# Patient Record
Sex: MALE | Race: OTHER | NOT HISPANIC OR LATINO | URBAN - METROPOLITAN AREA
[De-identification: names, ages, dates, MRNs, and addresses within clinical notes are randomized per-mention and may not be internally consistent; named-entity substitution may affect disease eponyms.]

---
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
## 2023-01-09 NOTE — BH INPATIENT PSYCHIATRY ASSESSMENT NOTE - NSBHMETABOLIC_PSY_ALL_CORE_FT
Done    BMI: BMI (kg/m2): 26.1 (03-26-22 @ 22:07)  HbA1c: A1C with Estimated Average Glucose Result: 4.3 % (03-27-22 @ 10:54)    Glucose:   BP: 125/60 (03-27-22 @ 07:46) (125/60 - 125/60)  Lipid Panel: Date/Time: 03-27-22 @ 10:54  Cholesterol, Serum: 147  Direct LDL: --  HDL Cholesterol, Serum: 55  Total Cholesterol/HDL Ration Measurement: --  Triglycerides, Serum: 70

## 2023-03-15 NOTE — BH PATIENT PROFILE - FUNCTIONAL ASSESSMENT - DAILY ACTIVITY 5.
Physical Therapy Visit    Visit Type: Daily Treatment Note  Visit: 12  Referring Provider: LUCITA Miller  Medical Diagnosis (from order): Diagnosis Information    Diagnosis  729.89 (ICD-9-CM) - R29.898 (ICD-10-CM) - Decreased strength of lower extremity         SUBJECTIVE                                                                                                               Reports that he is having a good day today. Notes that the hip has been feeling a little better as of late. Notes he talked to the MD and he does not have any weight restrictions at this time and is able to perform the elliptical.       OBJECTIVE                                                                                                                                       Treatment     Therapeutic Exercise  Upright bike, level 3, x8 minutes  Bridges x10   Hook-lying lower abdominal activation x10  Hook-lying lower abdominal activation x15  Leg press 105 lbs x15  Leg press 115 x15  Chops x10 with red resistance band       Manual Therapy   Hip harmonics, with towel roll under knee, grade I, 3x10 bilaterally   Hip long axis traction, supine, grade II, 3x10 bilaterally   Hip posterior glide, grade III, x10 bilaterally     Skilled input: as detailed above    Writer verbally educated and received verbal consent for hand placement, positioning of patient, and techniques to be performed today from patient for hand placement and palpation for techniques, therapist position for techniques and clothing adjustments for techniques as described above and how they are pertinent to the patient's plan of care.    Home Exercise Program  Deferred     Discussed focusing on sit<>Stand from his bed with a focus of hip hinging.       ASSESSMENT                                                                                                            Mike was able to progress the leg press this visit with no increase in discomfort. He has improved  ability to get on/off the plinth and overall has improved quality of motion. He still does lack lower extremity strength and core stability and will continue to benefit from skilled therapy services to maximize function.   Education:   - Results of above outlined education: Verbalizes understanding, Demonstrates understanding and Needs reinforcement    PLAN                                                                                                                           Suggestions for next session as indicated: Progress per plan of care, trial lifting activities, continue to progress core strength and stability.        Therapy procedure time and total treatment time can be found documented on the Time Entry flowsheet     4 = No assist / stand by assistance

## 2023-05-31 NOTE — BH INPATIENT PSYCHIATRY PROGRESS NOTE - NSBHCONSULTIPREASON_PSY_A_CORE
No danger to others; mental illness expected to respond to inpatient care danger to self; mental illness expected to respond to inpatient care

## 2023-09-12 ENCOUNTER — INPATIENT (INPATIENT)
Facility: HOSPITAL | Age: 18
LOS: 9 days | Discharge: HOME | End: 2023-09-22
Attending: PSYCHIATRY & NEUROLOGY | Admitting: PSYCHIATRY & NEUROLOGY
Payer: COMMERCIAL

## 2023-09-12 ENCOUNTER — OUTPATIENT (OUTPATIENT)
Dept: OUTPATIENT SERVICES | Facility: HOSPITAL | Age: 18
LOS: 1 days | Discharge: STILL ADMITTED OR ADMINISTRATIVE | End: 2023-09-12

## 2023-09-12 VITALS — TEMPERATURE: 98 F | WEIGHT: 194.01 LBS | HEIGHT: 71 IN

## 2023-09-12 DIAGNOSIS — F29 UNSPECIFIED PSYCHOSIS NOT DUE TO A SUBSTANCE OR KNOWN PHYSIOLOGICAL CONDITION: ICD-10-CM

## 2023-09-12 DIAGNOSIS — F19.10 OTHER PSYCHOACTIVE SUBSTANCE ABUSE, UNCOMPLICATED: ICD-10-CM

## 2023-09-12 LAB
FLUAV AG NPH QL: SIGNIFICANT CHANGE UP
FLUBV AG NPH QL: SIGNIFICANT CHANGE UP
RSV RNA NPH QL NAA+NON-PROBE: SIGNIFICANT CHANGE UP
SARS-COV-2 RNA SPEC QL NAA+PROBE: SIGNIFICANT CHANGE UP

## 2023-09-12 PROCEDURE — 99222 1ST HOSP IP/OBS MODERATE 55: CPT

## 2023-09-12 RX ORDER — DIPHENHYDRAMINE HCL 50 MG
50 CAPSULE ORAL ONCE
Refills: 0 | Status: DISCONTINUED | OUTPATIENT
Start: 2023-09-12 | End: 2023-09-22

## 2023-09-12 RX ORDER — HALOPERIDOL DECANOATE 100 MG/ML
5 INJECTION INTRAMUSCULAR EVERY 6 HOURS
Refills: 0 | Status: DISCONTINUED | OUTPATIENT
Start: 2023-09-12 | End: 2023-09-13

## 2023-09-12 RX ORDER — ACETAMINOPHEN 500 MG
650 TABLET ORAL EVERY 6 HOURS
Refills: 0 | Status: DISCONTINUED | OUTPATIENT
Start: 2023-09-12 | End: 2023-09-22

## 2023-09-12 RX ORDER — HALOPERIDOL DECANOATE 100 MG/ML
5 INJECTION INTRAMUSCULAR ONCE
Refills: 0 | Status: DISCONTINUED | OUTPATIENT
Start: 2023-09-12 | End: 2023-09-22

## 2023-09-12 RX ORDER — RISPERIDONE 4 MG/1
2 TABLET ORAL AT BEDTIME
Refills: 0 | Status: DISCONTINUED | OUTPATIENT
Start: 2023-09-12 | End: 2023-09-13

## 2023-09-12 RX ORDER — MAGNESIUM HYDROXIDE 400 MG/1
30 TABLET, CHEWABLE ORAL DAILY
Refills: 0 | Status: DISCONTINUED | OUTPATIENT
Start: 2023-09-12 | End: 2023-09-22

## 2023-09-12 RX ORDER — INFLUENZA VIRUS VACCINE 15; 15; 15; 15 UG/.5ML; UG/.5ML; UG/.5ML; UG/.5ML
0.5 SUSPENSION INTRAMUSCULAR ONCE
Refills: 0 | Status: DISCONTINUED | OUTPATIENT
Start: 2023-09-12 | End: 2023-09-22

## 2023-09-12 RX ORDER — DIPHENHYDRAMINE HCL 50 MG
50 CAPSULE ORAL EVERY 6 HOURS
Refills: 0 | Status: DISCONTINUED | OUTPATIENT
Start: 2023-09-12 | End: 2023-09-22

## 2023-09-12 RX ORDER — RISPERIDONE 4 MG/1
1 TABLET ORAL DAILY
Refills: 0 | Status: DISCONTINUED | OUTPATIENT
Start: 2023-09-12 | End: 2023-09-13

## 2023-09-12 RX ORDER — RISPERIDONE 4 MG/1
1 TABLET ORAL
Refills: 0 | Status: DISCONTINUED | OUTPATIENT
Start: 2023-09-12 | End: 2023-09-12

## 2023-09-12 RX ORDER — TRAZODONE HCL 50 MG
50 TABLET ORAL AT BEDTIME
Refills: 0 | Status: DISCONTINUED | OUTPATIENT
Start: 2023-09-12 | End: 2023-09-18

## 2023-09-12 RX ADMIN — Medication 50 MILLIGRAM(S): at 20:22

## 2023-09-12 RX ADMIN — RISPERIDONE 2 MILLIGRAM(S): 4 TABLET ORAL at 20:22

## 2023-09-12 NOTE — BH INPATIENT PSYCHIATRY ASSESSMENT NOTE - NSBHASSESSSUMMFT_PSY_ALL_CORE
- restart risperdal and increase to 1mg qd and 2mg po qhs  - routine admission labs and EKG ordered since [facility]   - symptom triggered CIWA ordered given reports of daily alcohol use and vague report on actual amount used

## 2023-09-12 NOTE — BH INPATIENT PSYCHIATRY ASSESSMENT NOTE - CURRENT MEDICATION
MEDICATIONS  (STANDING):    MEDICATIONS  (PRN):  acetaminophen     Tablet .. 650 milliGRAM(s) Oral every 6 hours PRN Temp greater or equal to 38C (100.4F), Mild Pain (1 - 3)  aluminum hydroxide/magnesium hydroxide/simethicone Suspension 30 milliLiter(s) Oral every 6 hours PRN Dyspepsia  diphenhydrAMINE 50 milliGRAM(s) Oral every 6 hours PRN Extrapyramidal prophylaxis or symptoms  haloperidol     Tablet 5 milliGRAM(s) Oral every 6 hours PRN mild agitation  haloperidol    Injectable 5 milliGRAM(s) IntraMuscular once PRN severe agitation  magnesium hydroxide Suspension 30 milliLiter(s) Oral daily PRN Constipation  traZODone 50 milliGRAM(s) Oral at bedtime PRN insomnia   MEDICATIONS  (STANDING):  influenza   Vaccine 0.5 milliLiter(s) IntraMuscular once  risperiDONE   Tablet 1 milliGRAM(s) Oral daily  risperiDONE   Tablet 2 milliGRAM(s) Oral at bedtime    MEDICATIONS  (PRN):  acetaminophen     Tablet .. 650 milliGRAM(s) Oral every 6 hours PRN Temp greater or equal to 38C (100.4F), Mild Pain (1 - 3)  aluminum hydroxide/magnesium hydroxide/simethicone Suspension 30 milliLiter(s) Oral every 6 hours PRN Dyspepsia  diphenhydrAMINE 50 milliGRAM(s) Oral every 6 hours PRN Extrapyramidal prophylaxis or symptoms  diphenhydrAMINE Injectable 50 milliGRAM(s) IntraMuscular once PRN Extrapyramidal prophylaxis  haloperidol     Tablet 5 milliGRAM(s) Oral every 6 hours PRN mild agitation  haloperidol    Injectable 5 milliGRAM(s) IntraMuscular once PRN severe agitation  LORazepam     Tablet 1 milliGRAM(s) Oral every 6 hours PRN anxiety  LORazepam   Injectable 2 milliGRAM(s) IntraMuscular once PRN severe anxiousness  magnesium hydroxide Suspension 30 milliLiter(s) Oral daily PRN Constipation  traZODone 50 milliGRAM(s) Oral at bedtime PRN insomnia
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
## 2023-09-12 NOTE — BH INPATIENT PSYCHIATRY ASSESSMENT NOTE - NSTXPROBSUBMIS_PSY_ALL_CORE
----- Message from Sheryl Garces NP sent at 2/17/2020 12:46 PM CST -----  Cholesterol test normal.   SUBSTANCE MISUSE

## 2023-09-12 NOTE — BH PATIENT PROFILE - FUNCTIONAL ASSESSMENT - BASIC MOBILITY 4.
I would recommend she discuss all these changes with her PCP, please forward to Dr. Holcomb  
Patient has questions about her diabetic medication.  Please advise.  
Patient requests a call back to go over instructions for taking the medications below   
Please tell patient that for now we will keep her on actos and not switch to jardiance. For the glipizide she can just start taking 1 tab of the 10mg ER dose daily, no need to cut in half (in place of 2.5mg dose). She should also try doing metformin 500mg XL once in the AM and once in the PM and see how this is tolerated; if it's tolerated well she can try slowly adding one pill to each dose over time as often if it's increased very slowly any side effects are minimized. Thanks!  
Received a fax from pharmacy. Glipizide ER 10 mg tablets cant be split in half. A new prescription needs to be placed in order to follow the direction from PCP. Patient also states that She does not want to take Jardiance. She states that she took that previously and it made her sick. Patient states she is not able to take Metformin 500 mg 4 tablets daily. Patient states that  To many tablets causes a GI upset.  
Relayed message below.  Pt verbalized understanding.     
Spoke to patient about below. Patient has no further questions at this time,  
4 = No assist / stand by assistance

## 2023-09-12 NOTE — BH INPATIENT PSYCHIATRY ASSESSMENT NOTE - OTHER
low end of fair  internally preoccupied  limited to low end of fair  looks internally preoccupied and at times concerned  adequate

## 2023-09-12 NOTE — BH INPATIENT PSYCHIATRY ASSESSMENT NOTE - DETAILS
3-5 years ago cutting self and wrapping cord from his headphones around his neck as NSSIB; Pt reports texting 911 "suicide" approx. 3 years ago due to concern he may attempt suicide though denies specific plan at that time, states he was concerned someone was going to harm his family if he didn't harm himself

## 2023-09-12 NOTE — BH INPATIENT PSYCHIATRY ASSESSMENT NOTE - VIOLENCE RISK FACTORS:
Substance abuse/Affective dysregulation/Lack of insight into violence risk/need for treatment/Noncompliance with treatment

## 2023-09-12 NOTE — BH PATIENT PROFILE - FALL HARM RISK - UNIVERSAL INTERVENTIONS
Bed in lowest position, wheels locked, appropriate side rails in place/Call bell, personal items and telephone in reach/Instruct patient to call for assistance before getting out of bed or chair/Non-slip footwear when patient is out of bed/[place] to call system/Physically safe environment - no spills, clutter or unnecessary equipment/Purposeful Proactive Rounding/Room/bathroom lighting operational, light cord in reach

## 2023-09-12 NOTE — BH INPATIENT PSYCHIATRY ASSESSMENT NOTE - NSBHMETABOLIC_PSY_ALL_CORE_FT
BMI:   HbA1c:   Glucose:   BP: --  Lipid Panel:  BMI: BMI (kg/m2): 27.1 (09-12-23 @ 16:35)  HbA1c:   Glucose:   BP: --  Lipid Panel:

## 2023-09-12 NOTE — BH INPATIENT PSYCHIATRY ASSESSMENT NOTE - RISK ASSESSMENT
acute risk factors of psychosis, unable to reality test, noncompliance, hx of substance use, insomnia , decline from baseline functioning

## 2023-09-12 NOTE — BH INPATIENT PSYCHIATRY ASSESSMENT NOTE - NSBHMSEMOVE_PSY_A_CORE
Patients VSS, up to void, wife here to drive him home, MD Forrest here to see patient, all sds discharge instructions, all belongings returned, all sds goals met,  
Pre-Op call completed. Medications list reviewed and updated as needed.  Instructed patient to hold all medications am of surgery except amlodipine and sotalol.  Patient instructed to arrive for surgery at 1430 on 11-27-17 at [facility].       Pre-op instructions reviewed, verbalized understanding.  Questions answered.  Call back number of [phone] given in case other concerns or questions arise.    Staff message sent to Dr. Zac Evans Jr in regards to when to stop Xarelto.  Dr. Field office to call patient and notify of stop date.      Pre-op Teaching Completed:    OR - Procedure, OR - Time and time of arrival, Location of check-in, NPO, Medications to take Day of Surgery, Pain Scale, Skin Prep and Discharge Policy:  Ride/Resp Adult      
No abnormal movements

## 2023-09-12 NOTE — BH INPATIENT PSYCHIATRY ASSESSMENT NOTE - NSBHCHARTREVIEWVS_PSY_A_CORE FT
Vital Signs Last 24 Hrs  T(C): 36.6 (09-12-23 @ 16:35), Max: 36.6 (09-12-23 @ 16:35)  T(F): 97.8 (09-12-23 @ 16:35), Max: 97.8 (09-12-23 @ 16:35)  HR: --  BP: --  BP(mean): --  RR: --  SpO2: --    Orthostatic VS  09-12-23 @ 16:35  Lying BP: --/-- HR: --  Sitting BP: 112/78 HR: 82  Standing BP: 110/70 HR: 80  Site: --  Mode: --   T(C): 36.6 (09-12-23 @ 16:35), Max: 36.6 (09-12-23 @ 16:35)  HR: --  BP: --  RR: --  SpO2: --

## 2023-09-13 DIAGNOSIS — F12.10 CANNABIS ABUSE, UNCOMPLICATED: ICD-10-CM

## 2023-09-13 DIAGNOSIS — F12.159 CANNABIS ABUSE WITH PSYCHOTIC DISORDER, UNSPECIFIED: ICD-10-CM

## 2023-09-13 LAB
A1C WITH ESTIMATED AVERAGE GLUCOSE RESULT: 5.4 % — SIGNIFICANT CHANGE UP (ref 4–5.6)
ANION GAP SERPL CALC-SCNC: 15 MMOL/L — HIGH (ref 7–14)
BUN SERPL-MCNC: 16 MG/DL — SIGNIFICANT CHANGE UP (ref 7–23)
CALCIUM SERPL-MCNC: 9.5 MG/DL — SIGNIFICANT CHANGE UP (ref 8.4–10.5)
CHLORIDE SERPL-SCNC: 102 MMOL/L — SIGNIFICANT CHANGE UP (ref 98–107)
CHOLEST SERPL-MCNC: 150 MG/DL — SIGNIFICANT CHANGE UP
CO2 SERPL-SCNC: 23 MMOL/L — SIGNIFICANT CHANGE UP (ref 22–31)
CREAT SERPL-MCNC: 1.06 MG/DL — SIGNIFICANT CHANGE UP (ref 0.5–1.3)
EGFR: 103 ML/MIN/1.73M2 — SIGNIFICANT CHANGE UP
ESTIMATED AVERAGE GLUCOSE: 108 — SIGNIFICANT CHANGE UP
GLUCOSE SERPL-MCNC: 92 MG/DL — SIGNIFICANT CHANGE UP (ref 70–99)
HCT VFR BLD CALC: 44.8 % — SIGNIFICANT CHANGE UP (ref 39–50)
HDLC SERPL-MCNC: 34 MG/DL — LOW
HGB BLD-MCNC: 15.3 G/DL — SIGNIFICANT CHANGE UP (ref 13–17)
LIPID PNL WITH DIRECT LDL SERPL: 81 MG/DL — SIGNIFICANT CHANGE UP
MAGNESIUM SERPL-MCNC: 2.1 MG/DL — SIGNIFICANT CHANGE UP (ref 1.6–2.6)
MCHC RBC-ENTMCNC: 29.5 PG — SIGNIFICANT CHANGE UP (ref 27–34)
MCHC RBC-ENTMCNC: 34.2 GM/DL — SIGNIFICANT CHANGE UP (ref 32–36)
MCV RBC AUTO: 86.3 FL — SIGNIFICANT CHANGE UP (ref 80–100)
NON HDL CHOLESTEROL: 116 MG/DL — SIGNIFICANT CHANGE UP
NRBC # BLD: 0 /100 WBCS — SIGNIFICANT CHANGE UP (ref 0–0)
NRBC # FLD: 0 K/UL — SIGNIFICANT CHANGE UP (ref 0–0)
PHOSPHATE SERPL-MCNC: 3.2 MG/DL — SIGNIFICANT CHANGE UP (ref 2.5–4.5)
PLATELET # BLD AUTO: 261 K/UL — SIGNIFICANT CHANGE UP (ref 150–400)
POTASSIUM SERPL-MCNC: 4.2 MMOL/L — SIGNIFICANT CHANGE UP (ref 3.5–5.3)
POTASSIUM SERPL-SCNC: 4.2 MMOL/L — SIGNIFICANT CHANGE UP (ref 3.5–5.3)
RBC # BLD: 5.19 M/UL — SIGNIFICANT CHANGE UP (ref 4.2–5.8)
RBC # FLD: 13.2 % — SIGNIFICANT CHANGE UP (ref 10.3–14.5)
SODIUM SERPL-SCNC: 140 MMOL/L — SIGNIFICANT CHANGE UP (ref 135–145)
TRIGL SERPL-MCNC: 173 MG/DL — HIGH
TSH SERPL-MCNC: 2.23 UIU/ML — SIGNIFICANT CHANGE UP (ref 0.27–4.2)
WBC # BLD: 6.87 K/UL — SIGNIFICANT CHANGE UP (ref 3.8–10.5)
WBC # FLD AUTO: 6.87 K/UL — SIGNIFICANT CHANGE UP (ref 3.8–10.5)

## 2023-09-13 PROCEDURE — 93010 ELECTROCARDIOGRAM REPORT: CPT

## 2023-09-13 RX ORDER — ARIPIPRAZOLE 15 MG/1
5 TABLET ORAL AT BEDTIME
Refills: 0 | Status: DISCONTINUED | OUTPATIENT
Start: 2023-09-13 | End: 2023-09-18

## 2023-09-13 RX ORDER — OLANZAPINE 15 MG/1
5 TABLET, FILM COATED ORAL EVERY 4 HOURS
Refills: 0 | Status: DISCONTINUED | OUTPATIENT
Start: 2023-09-13 | End: 2023-09-22

## 2023-09-13 RX ORDER — ARIPIPRAZOLE 15 MG/1
2 TABLET ORAL DAILY
Refills: 0 | Status: DISCONTINUED | OUTPATIENT
Start: 2023-09-14 | End: 2023-09-18

## 2023-09-13 RX ORDER — PHENYLEPHRINE-SHARK LIVER OIL-MINERAL OIL-PETROLATUM RECTAL OINTMENT
1 OINTMENT (GRAM) RECTAL EVERY 6 HOURS
Refills: 0 | Status: DISCONTINUED | OUTPATIENT
Start: 2023-09-13 | End: 2023-09-22

## 2023-09-13 RX ADMIN — ARIPIPRAZOLE 5 MILLIGRAM(S): 15 TABLET ORAL at 20:16

## 2023-09-13 RX ADMIN — RISPERIDONE 1 MILLIGRAM(S): 4 TABLET ORAL at 08:05

## 2023-09-13 RX ADMIN — Medication 1 MILLIGRAM(S): at 03:12

## 2023-09-13 NOTE — PSYCHIATRIC REHAB INITIAL EVALUATION - NSBHPRRECOMMEND_PSY_ALL_CORE
Writer met with patient in order to orient patient to unit, and introduce patient to psychiatric staff and department functions. Patient was also oriented to safety planning protocol and was informed that safety planning will be an integral aspect of their care during their inpatient hospitalization. Patient was sleeping but responded to writer's verbal prompts. Pt was disorganized and was superficially engaged during the meeting. Pt reported that his paranoid thoughts towards his family and believes his families are clones and he does not feel safe to stay with them. Pt also reported VH of seeing things but could not elaborate it and stated that he needs to go to check his eyes. Pt denies SI/HI/AV at this time. Pt started masturbating when writer attempted to explore coping skills with pt. Pt was able to be redirected and was informed that was not appropriate doing it right in front of people. Pt explained masturbating is his way to comfort himself. Writer encouraged the pt to attend psych rehab groups to explore healthy coping skills. Writer collaborated with patient to select an appropriate psychiatric rehabilitation goal. Psychiatric rehabilitation staff will continue to engage patient daily in order to develop therapeutic rapport.

## 2023-09-13 NOTE — PSYCHIATRIC REHAB INITIAL EVALUATION - NSBHALCSUBTREAT_PSY_ALL_CORE
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

## 2023-09-13 NOTE — BH INPATIENT PSYCHIATRY PROGRESS NOTE - NSBHMETABOLIC_PSY_ALL_CORE_FT
BMI: BMI (kg/m2): 27.1 (09-12-23 @ 16:35)  HbA1c: A1C with Estimated Average Glucose Result: 5.4 % (09-13-23 @ 09:30)    Glucose:   BP: 126/79 (09-13-23 @ 06:35) (126/79 - 126/79)  Lipid Panel:  BMI: BMI (kg/m2): 27.1 (09-12-23 @ 16:35)  HbA1c: A1C with Estimated Average Glucose Result: 5.4 % (09-13-23 @ 09:30)    Glucose:   BP: 126/79 (09-13-23 @ 06:35) (126/79 - 126/79)  Lipid Panel: Date/Time: 09-13-23 @ 09:30  Cholesterol, Serum: 150  Direct LDL: --  HDL Cholesterol, Serum: 34  Total Cholesterol/HDL Ration Measurement: --  Triglycerides, Serum: 173

## 2023-09-13 NOTE — BH INPATIENT PSYCHIATRY PROGRESS NOTE - PRN MEDS
MEDICATIONS  (PRN):  acetaminophen     Tablet .. 650 milliGRAM(s) Oral every 6 hours PRN Temp greater or equal to 38C (100.4F), Mild Pain (1 - 3)  aluminum hydroxide/magnesium hydroxide/simethicone Suspension 30 milliLiter(s) Oral every 6 hours PRN Dyspepsia  diphenhydrAMINE 50 milliGRAM(s) Oral every 6 hours PRN Extrapyramidal prophylaxis or symptoms  diphenhydrAMINE Injectable 50 milliGRAM(s) IntraMuscular once PRN Extrapyramidal prophylaxis  haloperidol     Tablet 5 milliGRAM(s) Oral every 6 hours PRN mild agitation  haloperidol    Injectable 5 milliGRAM(s) IntraMuscular once PRN severe agitation  LORazepam     Tablet 2 milliGRAM(s) Oral every 2 hours PRN CIWA score 8 to 11  LORazepam     Tablet 1 milliGRAM(s) Oral every 6 hours PRN anxiety  LORazepam   Injectable 2 milliGRAM(s) IntraMuscular once PRN severe anxiousness  LORazepam   Injectable 2 milliGRAM(s) IntraMuscular every 2 hours PRN CIWA score 12 or higher  magnesium hydroxide Suspension 30 milliLiter(s) Oral daily PRN Constipation  traZODone 50 milliGRAM(s) Oral at bedtime PRN insomnia   MEDICATIONS  (PRN):  acetaminophen     Tablet .. 650 milliGRAM(s) Oral every 6 hours PRN Temp greater or equal to 38C (100.4F), Mild Pain (1 - 3)  aluminum hydroxide/magnesium hydroxide/simethicone Suspension 30 milliLiter(s) Oral every 6 hours PRN Dyspepsia  diphenhydrAMINE 50 milliGRAM(s) Oral every 6 hours PRN Extrapyramidal prophylaxis or symptoms  diphenhydrAMINE Injectable 50 milliGRAM(s) IntraMuscular once PRN Extrapyramidal prophylaxis  haloperidol     Tablet 5 milliGRAM(s) Oral every 6 hours PRN mild agitation  haloperidol    Injectable 5 milliGRAM(s) IntraMuscular once PRN severe agitation  LORazepam     Tablet 1 milliGRAM(s) Oral every 6 hours PRN anxiety  LORazepam     Tablet 2 milliGRAM(s) Oral every 2 hours PRN CIWA score 8 to 11  LORazepam   Injectable 2 milliGRAM(s) IntraMuscular once PRN severe anxiousness  LORazepam   Injectable 2 milliGRAM(s) IntraMuscular every 2 hours PRN CIWA score 12 or higher  magnesium hydroxide Suspension 30 milliLiter(s) Oral daily PRN Constipation  traZODone 50 milliGRAM(s) Oral at bedtime PRN insomnia   MEDICATIONS  (PRN):  acetaminophen     Tablet .. 650 milliGRAM(s) Oral every 6 hours PRN Temp greater or equal to 38C (100.4F), Mild Pain (1 - 3)  aluminum hydroxide/magnesium hydroxide/simethicone Suspension 30 milliLiter(s) Oral every 6 hours PRN Dyspepsia  diphenhydrAMINE 50 milliGRAM(s) Oral every 6 hours PRN Extrapyramidal prophylaxis or symptoms  diphenhydrAMINE Injectable 50 milliGRAM(s) IntraMuscular once PRN Extrapyramidal prophylaxis  haloperidol    Injectable 5 milliGRAM(s) IntraMuscular once PRN severe agitation  LORazepam     Tablet 2 milliGRAM(s) Oral every 2 hours PRN CIWA score 8 to 11  LORazepam     Tablet 1 milliGRAM(s) Oral every 6 hours PRN anxiety  LORazepam   Injectable 2 milliGRAM(s) IntraMuscular once PRN severe anxiousness  LORazepam   Injectable 2 milliGRAM(s) IntraMuscular every 2 hours PRN CIWA score 12 or higher  magnesium hydroxide Suspension 30 milliLiter(s) Oral daily PRN Constipation  OLANZapine 5 milliGRAM(s) Oral every 4 hours PRN agitation  traZODone 50 milliGRAM(s) Oral at bedtime PRN insomnia

## 2023-09-13 NOTE — BH INPATIENT PSYCHIATRY PROGRESS NOTE - NSBHASSESSSUMMFT_PSY_ALL_CORE
- restart risperdal and increase to 1mg qd and 2mg po qhs  - routine admission labs and EKG ordered since [facility] came to [facility]   - symptom triggered CIWA ordered given reports of daily alcohol use and vague report on actual amount used  18 year old male domiciled with family, PPS of cannabis-induced psychosis with multiple admissions since 2022, presenting with worsening psychosis since 8/30- restart risperdal and increase to 1mg qd and 2mg po qhs  - routine admission labs and EKG ordered since Patient came to [facility]   - symptom triggered CIWA ordered given reports of daily alcohol use and vague report on actual amount used  18 year old male domiciled with family, PPS of psychosis secondary to cannabis, hx of multiple admissions since 2022, hx of SA 3 years ago, cannabis use disorder, no hx of violence, no PMHx, presented with worsening cannabis induced-psychosis and paranoia since 08/30.    - restart risperdal and increase to 1mg qd and 2mg po qhs  - routine admission labs and EKG ordered since Patient came to [facility]   - symptom triggered CIWA ordered given reports of daily alcohol use and vague report on actual amount used     Updates 09/13  Patient cooperative on examination   Patient endorses delusions about family being possessed by demons   Patient amenable to medication adjustment    PLAN 18 year old male domiciled with family, PPS of psychosis secondary to cannabis, hx of multiple admissions since 2022, hx of SA 3 years ago, cannabis use disorder, no hx of violence, no PMHx, presented with worsening cannabis induced-psychosis and paranoia since 08/30.    - restart risperdal and increase to 1mg qd and 2mg po qhs  - routine admission labs and EKG ordered since Patient came to [facility]   - symptom triggered CIWA ordered given reports of daily alcohol use and vague report on actual amount used     Updates 09/13  Patient cooperative on examination   Patient endorses delusions about family being possessed by demons   Patient amenable to medication adjustment    PLAN  Patient requires acute inpatient care for treatment of psychosis  Patient admitted on voluntary status   Patient does not require constant observation at this time and will follow with routine checks   Treatment will include individual therapy/supportive therapy/rehab therapy/ psychopharmacological therapy and milieu therapy  18 year old male domiciled with family, PPS of psychosis, hx of multiple admissions since 2022, hx of SA 3 years ago, cannabis use disorder, no hx of violence, no PMHx, presented with worsening cannabis induced-psychosis and paranoia since 08/30.       Updates 09/13  Patient cooperative on examination but delusional and with   Has submitted a 72 hour letter for his discharge  Patient endorses delusions about family being possessed by demons   Patient amenable to medication adjustment    PLAN  Patient requires acute inpatient care for treatment of psychosis  Patient admitted on a 913 voluntary status but submitted 3 day letter on 9/12  Patient does not require constant observation at this time and will follow with routine checks   will discontinue risperidone and start aripiprazole trial with goal of symptom stability and good tolerability with goal of SERRATO  Treatment will include individual therapy/supportive therapy/rehab therapy/ psychopharmacological therapy and milieu therapy

## 2023-09-13 NOTE — BH INPATIENT PSYCHIATRY PROGRESS NOTE - NSBHFUPINTERVALHXFT_PSY_A_CORE
Patient admitted for psychotic episode secondary to cannabis use   Chart reviewed and discussed with team    Patient states that he has been feeling as though his family might be possessed and has felt this way for a few weeks. Patient states he presented to the hospital because he didn't feel safe at home and wanted "a safe place" to sleep at night. Patient states he also heard voices occasionally that someone knows information about him, says voices are usually at night. Patient feels that "someone is getting information" from his brain. Patient states his birth lawrence might have something to do with it, says his cousin told him that everyone has birth marks. Patient states that before this episode he was feeling really good, states he was working out and didn't have any paranoid feelings about his family. Patient states that he used to be on Risperadol but doesn't recall getting any refills. Patient states he has been smoking weed "all year" and it usually makes him feel relaxed. Patient states that sometimes he can "feel the spirits getting closer" Patient also feels that demon can put thoughts in his head, often "bad" scenarios. Patient states in one of those scenarios something bad was going to happen to his grandmother, patient suspected that aunt may also be possessed. Patient states he has never attacked anyone but feels that his family wants to hurt him. Patient says he had a suicide attempt previously where he tried to strangle himself. Patient denies any suicidal ideation. Patient denies visual hallucinations. Patient states that he enjoys exercising, video games, and watches. Patient says he is in school currently and would like to study business. Patient would like to create his own [employer] business. Patient states that he also drinks wine regularly and would drink more if he could afford it. Patient says he is open to taking medications if that would help him. Patient also states his family can be contacted.  Patient admitted for psychotic episode secondary to cannabis use   Chart reviewed and discussed with team    Patient [facility] through [facility]  Has history of 4 previous pt seen for follow up of psychotic symptoms.  Patient remains with thought disorganization but has been in behavioral control and taking medications admissions, 1 at [facility], 1 at [facility] and 2 at [address]   Patient states that he has been feeling as though his family might be possessed and has felt this way for a few weeks. Patient states he presented to the hospital because he didn't feel safe at home and wanted "a safe place" to sleep at night. Patient states he also heard voices occasionally that someone knows information about him, says voices are usually at night. Patient feels that "someone is getting information" from his brain. Patient states his birth lawrence might have something to do with it, says his cousin told him that everyone has birth marks. Patient states that before this episode he was feeling really good, states he was working out and didn't have any paranoid feelings about his family. Patient states that he used to be on Risperadol but doesn't recall getting any refills. Patient states he has been smoking weed "all year" and it usually makes him feel relaxed. Patient states that sometimes he can "feel the spirits getting closer" Patient also feels that demon can put thoughts in his head, often "bad" scenarios. Patient states in one of those scenarios something bad was going to happen to his grandmother, patient suspected that aunt may also be possessed. Patient states he has never attacked anyone but feels that his family wants to hurt him. Patient says he had a suicide attempt previously where he tried to strangle himself. Patient denies any suicidal ideation. Patient denies visual hallucinations. Patient states that he enjoys exercising, video games, and watches. Patient says he is in school currently and would like to study business. 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 Patient states that he also drinks wine regularly and would drink more if he could afford it. Patient says he is open to taking medications if that would help him. Patient also states his family can be contacted.

## 2023-09-13 NOTE — BH INPATIENT PSYCHIATRY PROGRESS NOTE - NSCGISEVERILLNESS_PSY_ALL_CORE
7 = Among the most extremely ill patients – pathology drastically interferes in many life functions; may be hospitalized 6 = Severely ill - disruptive pathology, behavior and function are frequently influenced by symptoms, may require assistance from others

## 2023-09-13 NOTE — BH SOCIAL WORK INITIAL PSYCHOSOCIAL EVALUATION - OTHER PAST PSYCHIATRIC HISTORY (INCLUDE DETAILS REGARDING ONSET, COURSE OF ILLNESS, INPATIENT/OUTPATIENT TREATMENT)
Pt is a 17 yo male with a history of hospitalizations, psychosis and paranoia. Pt was attending [facility], lives at home with parents and brothers. Pt last took psychiatric medication one year ago, has not followed up with psychiatrist. Pt has little insight into his illness, reports he needs his eye tested due to seeing things. Pt reports A/V/H, came to the hospital to rest his mind.

## 2023-09-13 NOTE — BH INPATIENT PSYCHIATRY PROGRESS NOTE - MSE UNSTRUCTURED FT
On exam today the patient is anxious but cooperative.    Speech is of normal rate     Thought process: generally tangential with thought blocking  Thought content: patient has delusions of family members being possessed by demons   Perception:  Hearing voices that someone is "getting information" from him and that "bad things will happen"  Mood: Describes as "afraid".  Affect: flat.    Patient denies active suicidal ideation, intention and plan.    Patient denies active aggressive/homicidal ideation, intent or plan.   Patient is Alert and oriented .   Patient is cognitively grossly intact.   Fund of knowledge is fair. Memory is intact  Insight and judgment are limited. Impulse control is intact at this time.    Vital signs are stable.    On exam today the patient is anxious but cooperative.    Speech is of normal rate     Thought process: generally tangential with thought blocking  Thought content: patient has delusions of family members being possessed by demons or imposters. Also with delusions of thought broadcast/transmission  Perception:  Hearing voices that someone is "getting information" from him and that "bad things will happen"  Mood: Describes as "afraid".  Affect: flat.    Patient denies active suicidal ideation, intention and plan.    Patient denies active aggressive/homicidal ideation, intent or plan.   Patient is Alert and oriented .   Patient is cognitively grossly intact.   Fund of knowledge is fair. Memory is intact  Insight and judgment are limited. Impulse control is intact at this time.    Vital signs are stable.

## 2023-09-13 NOTE — BH INPATIENT PSYCHIATRY PROGRESS NOTE - CURRENT MEDICATION
MEDICATIONS  (STANDING):  influenza   Vaccine 0.5 milliLiter(s) IntraMuscular once  risperiDONE   Tablet 1 milliGRAM(s) Oral daily  risperiDONE   Tablet 2 milliGRAM(s) Oral at bedtime    MEDICATIONS  (PRN):  acetaminophen     Tablet .. 650 milliGRAM(s) Oral every 6 hours PRN Temp greater or equal to 38C (100.4F), Mild Pain (1 - 3)  aluminum hydroxide/magnesium hydroxide/simethicone Suspension 30 milliLiter(s) Oral every 6 hours PRN Dyspepsia  diphenhydrAMINE 50 milliGRAM(s) Oral every 6 hours PRN Extrapyramidal prophylaxis or symptoms  diphenhydrAMINE Injectable 50 milliGRAM(s) IntraMuscular once PRN Extrapyramidal prophylaxis  haloperidol     Tablet 5 milliGRAM(s) Oral every 6 hours PRN mild agitation  haloperidol    Injectable 5 milliGRAM(s) IntraMuscular once PRN severe agitation  LORazepam     Tablet 2 milliGRAM(s) Oral every 2 hours PRN CIWA score 8 to 11  LORazepam     Tablet 1 milliGRAM(s) Oral every 6 hours PRN anxiety  LORazepam   Injectable 2 milliGRAM(s) IntraMuscular once PRN severe anxiousness  LORazepam   Injectable 2 milliGRAM(s) IntraMuscular every 2 hours PRN CIWA score 12 or higher  magnesium hydroxide Suspension 30 milliLiter(s) Oral daily PRN Constipation  traZODone 50 milliGRAM(s) Oral at bedtime PRN insomnia   MEDICATIONS  (STANDING):  influenza   Vaccine 0.5 milliLiter(s) IntraMuscular once  risperiDONE   Tablet 1 milliGRAM(s) Oral daily  risperiDONE   Tablet 2 milliGRAM(s) Oral at bedtime    MEDICATIONS  (PRN):  acetaminophen     Tablet .. 650 milliGRAM(s) Oral every 6 hours PRN Temp greater or equal to 38C (100.4F), Mild Pain (1 - 3)  aluminum hydroxide/magnesium hydroxide/simethicone Suspension 30 milliLiter(s) Oral every 6 hours PRN Dyspepsia  diphenhydrAMINE 50 milliGRAM(s) Oral every 6 hours PRN Extrapyramidal prophylaxis or symptoms  diphenhydrAMINE Injectable 50 milliGRAM(s) IntraMuscular once PRN Extrapyramidal prophylaxis  haloperidol     Tablet 5 milliGRAM(s) Oral every 6 hours PRN mild agitation  haloperidol    Injectable 5 milliGRAM(s) IntraMuscular once PRN severe agitation  LORazepam     Tablet 1 milliGRAM(s) Oral every 6 hours PRN anxiety  LORazepam     Tablet 2 milliGRAM(s) Oral every 2 hours PRN CIWA score 8 to 11  LORazepam   Injectable 2 milliGRAM(s) IntraMuscular once PRN severe anxiousness  LORazepam   Injectable 2 milliGRAM(s) IntraMuscular every 2 hours PRN CIWA score 12 or higher  magnesium hydroxide Suspension 30 milliLiter(s) Oral daily PRN Constipation  traZODone 50 milliGRAM(s) Oral at bedtime PRN insomnia   MEDICATIONS  (STANDING):  ARIPiprazole 5 milliGRAM(s) Oral at bedtime  influenza   Vaccine 0.5 milliLiter(s) IntraMuscular once    MEDICATIONS  (PRN):  acetaminophen     Tablet .. 650 milliGRAM(s) Oral every 6 hours PRN Temp greater or equal to 38C (100.4F), Mild Pain (1 - 3)  aluminum hydroxide/magnesium hydroxide/simethicone Suspension 30 milliLiter(s) Oral every 6 hours PRN Dyspepsia  diphenhydrAMINE 50 milliGRAM(s) Oral every 6 hours PRN Extrapyramidal prophylaxis or symptoms  diphenhydrAMINE Injectable 50 milliGRAM(s) IntraMuscular once PRN Extrapyramidal prophylaxis  haloperidol    Injectable 5 milliGRAM(s) IntraMuscular once PRN severe agitation  LORazepam     Tablet 2 milliGRAM(s) Oral every 2 hours PRN CIWA score 8 to 11  LORazepam     Tablet 1 milliGRAM(s) Oral every 6 hours PRN anxiety  LORazepam   Injectable 2 milliGRAM(s) IntraMuscular once PRN severe anxiousness  LORazepam   Injectable 2 milliGRAM(s) IntraMuscular every 2 hours PRN CIWA score 12 or higher  magnesium hydroxide Suspension 30 milliLiter(s) Oral daily PRN Constipation  OLANZapine 5 milliGRAM(s) Oral every 4 hours PRN agitation  traZODone 50 milliGRAM(s) Oral at bedtime PRN insomnia

## 2023-09-14 PROCEDURE — 99233 SBSQ HOSP IP/OBS HIGH 50: CPT

## 2023-09-14 RX ORDER — POLYETHYLENE GLYCOL 3350 17 G/17G
17 POWDER, FOR SOLUTION ORAL DAILY
Refills: 0 | Status: DISCONTINUED | OUTPATIENT
Start: 2023-09-14 | End: 2023-09-22

## 2023-09-14 RX ADMIN — MAGNESIUM HYDROXIDE 30 MILLILITER(S): 400 TABLET, CHEWABLE ORAL at 08:22

## 2023-09-14 RX ADMIN — ARIPIPRAZOLE 2 MILLIGRAM(S): 15 TABLET ORAL at 08:18

## 2023-09-14 RX ADMIN — Medication 50 MILLIGRAM(S): at 20:34

## 2023-09-14 RX ADMIN — ARIPIPRAZOLE 5 MILLIGRAM(S): 15 TABLET ORAL at 20:34

## 2023-09-14 RX ADMIN — POLYETHYLENE GLYCOL 3350 17 GRAM(S): 17 POWDER, FOR SOLUTION ORAL at 09:02

## 2023-09-14 NOTE — BH INPATIENT PSYCHIATRY PROGRESS NOTE - NSBHASSESSSUMMFT_PSY_ALL_CORE
18 year old male domiciled with family, PPS of psychosis, hx of multiple admissions since 2022, hx of SA 3 years ago, cannabis use disorder, no hx of violence, no PMHx, presented with worsening cannabis induced-psychosis and paranoia since 08/30.       Updates 09/13  Patient cooperative on examination but delusional and with   Has submitted a 72 hour letter for his discharge  Patient endorses delusions about family being possessed by demons   Patient amenable to medication adjustment    PLAN  Patient requires acute inpatient care for treatment of psychosis  Patient admitted on a 913 voluntary status but submitted 3 day letter on 9/12  Patient does not require constant observation at this time and will follow with routine checks   will discontinue risperidone and start aripiprazole trial with goal of symptom stability and good tolerability with goal of SERRATO  Treatment will include individual therapy/supportive therapy/rehab therapy/ psychopharmacological therapy and milieu therapy  18 year old male domiciled with family, PPS of psychosis, hx of multiple admissions since 2022, hx of SA 3 years ago, cannabis use disorder, no hx of violence, no PMHx, presented with worsening cannabis induced-psychosis and paranoia since 08/30.       Updates 09/14  Patient cooperative on examination but delusional and with   Has submitted a 72 hour letter for his discharge  Patient aware discharge unlikely due to continued psychosis   Family amenable to extension of inpatient treatment and SERRATO use  Patient endorses delusions about family being possessed by demons     PLAN  Patient requires acute inpatient care for treatment of psychosis  Patient admitted on a 913 voluntary status but submitted 3 day letter on 9/12  Patient does not require constant observation at this time and will follow with routine checks   Continue aripiprazole trial with goal of symptom stability and good tolerability with goal of SERRATO  Treatment will include individual therapy/supportive therapy/rehab therapy/ psychopharmacological therapy and milieu therapy  18 year old male domiciled with family, PPS of psychosis, hx of multiple admissions since 2022, hx of SA 3 years ago, cannabis use disorder, no hx of violence, no PMHx, presented with worsening cannabis induced-psychosis and paranoia since 08/30.       Updates 09/14  Patient cooperative on examination but delusional and with   Has submitted a 72 hour letter for his discharge  Patient aware discharge unlikely due to continued psychosis   Family amenable to extension of inpatient treatment and SERRATO use  Patient endorses delusions about family being possessed by demons     PLAN  Patient requires acute inpatient care for treatment of psychosis  Patient admitted on a 913 voluntary status but submitted 3 day letter on 9/12  Patient too ill to be discharged to  a lower level of care at this time and will be retained on his 72 hour letter on 9/15  Patient does not require constant observation at this time and will follow with routine checks   Aripiprazole 2 AM and 5 mg HS  Goal of transition to SERRATO  Treatment will include individual therapy/supportive therapy/rehab therapy/ psychopharmacological therapy and milieu therapy

## 2023-09-14 NOTE — BH INPATIENT PSYCHIATRY PROGRESS NOTE - PRN MEDS
MEDICATIONS  (PRN):  acetaminophen     Tablet .. 650 milliGRAM(s) Oral every 6 hours PRN Temp greater or equal to 38C (100.4F), Mild Pain (1 - 3)  aluminum hydroxide/magnesium hydroxide/simethicone Suspension 30 milliLiter(s) Oral every 6 hours PRN Dyspepsia  diphenhydrAMINE 50 milliGRAM(s) Oral every 6 hours PRN Extrapyramidal prophylaxis or symptoms  diphenhydrAMINE Injectable 50 milliGRAM(s) IntraMuscular once PRN Extrapyramidal prophylaxis  haloperidol    Injectable 5 milliGRAM(s) IntraMuscular once PRN severe agitation  hemorrhoidal Ointment 1 Application(s) Rectal every 6 hours PRN rectal irritation  LORazepam     Tablet 1 milliGRAM(s) Oral every 6 hours PRN anxiety  LORazepam     Tablet 2 milliGRAM(s) Oral every 2 hours PRN CIWA score 8 to 11  LORazepam   Injectable 2 milliGRAM(s) IntraMuscular once PRN severe anxiousness  LORazepam   Injectable 2 milliGRAM(s) IntraMuscular every 2 hours PRN CIWA score 12 or higher  magnesium hydroxide Suspension 30 milliLiter(s) Oral daily PRN Constipation  OLANZapine 5 milliGRAM(s) Oral every 4 hours PRN agitation  polyethylene glycol 3350 17 Gram(s) Oral daily PRN constipation  traZODone 50 milliGRAM(s) Oral at bedtime PRN insomnia

## 2023-09-14 NOTE — BH INPATIENT PSYCHIATRY PROGRESS NOTE - NSBHFUPINTERVALHXFT_PSY_A_CORE
Patient admitted for psychotic episode secondary to cannabis use   Chart reviewed and discussed with team    [facility]  Has history of 4 previous pt seen for follow up of psychotic symptoms.  Patient remains with thought disorganization but has been in behavioral control and taking medications admissions, 1 at [place], 1 at [facility] and 2 at [address]   Patient states that his family visited last night, states that they're '"physically the same" but say things that indicate they're possessed. When asked what that means patient says they assured him that they are his parents, and if they weren't possessed they "wouldn't have to say that." Patient says he isn't sure if his actual parents are trapped inside the bodies, states they could be controlled by demons or "dark energies." Patient states he does research on possession, states he watched [employer] and also did reading on Twitter. Patient says he heard voices last night but couldn't make out what they were saying, when questioned if it could have been a dream patient says he's not sure. Patient says he is amenable to medications and SERRATO. Spoke to patients parents on the phone, parents stated patient is an honor student but has had prior episodes of marijuana induced psychosis. Parent's stated they brought him into the hospital when they recognized he was having an episode with increased paranoia. Parents said patient attended [employer] schooling which may be the basis for the Holiness delusions. Parents said he did not receive any refills of his Risperidone so illness was not managed once he ran out of his 30-day supply of medications. Discussed with patient his three day letter and that it is unlikely he will be discharged as he is still exhibiting severe symptoms and treatment is still being managed.  Patient admitted for psychotic episode secondary to cannabis use   Chart reviewed and discussed with team    [facility]  Has history of 4 previous pt seen for follow up of psychotic symptoms.  Patient remains with thought disorganization but has been in behavioral control and taking medications admissions, 1 at [facility], 1 at [facility] and 2 at [address]   Patient states that his family visited last night, states that they're '"physically the same" but say things that indicate they're possessed. When asked what that means patient says they assured him that they are his parents, and if they weren't possessed they "wouldn't have to say that." Patient says he isn't sure if his actual parents are trapped inside the bodies, states they could be controlled by demons or "dark energies." Patient states he does research on possession, states he watched [employer] and also did reading on Twitter. Patient says he heard voices last night but couldn't make out what they were saying, when questioned if it could have been a dream patient says he's not sure. Patient says he is amenable to medications and SERRATO. Spoke to patients parents on the phone, parents stated patient is an honor student but has had prior episodes of marijuana induced psychosis. Parent's stated they brought him into the hospital when they recognized he was having an episode with increased paranoia. Parents said patient attended [employer] schooling which may be the basis for the Zoroastrian delusions. Parents said he did not receive any refills of his Risperidone so illness was not managed once he ran out of his 30-day supply of medications. Discussed with patient his three day letter and that it is unlikely he will be discharged as he is still exhibiting severe symptoms and remains too ill to safely be discharged to any lower level of care at this time.

## 2023-09-14 NOTE — BH INPATIENT PSYCHIATRY PROGRESS NOTE - MSE UNSTRUCTURED FT
On exam today the patient is anxious but cooperative.    Speech is of normal rate     Thought process: generally tangential with thought blocking  Thought content: patient has delusions of family members being possessed by demons or imposters. Also with delusions of thought broadcast/transmission  Perception:  Hearing voices that someone is "getting information" from him and that "bad things will happen"  Mood: Describes as "afraid".  Affect: flat.    Patient denies active suicidal ideation, intention and plan.    Patient denies active aggressive/homicidal ideation, intent or plan.   Patient is Alert and oriented .   Patient is cognitively grossly intact.   Fund of knowledge is fair. Memory is intact  Insight and judgment are limited. Impulse control is intact at this time.    Vital signs are stable.    On exam today the patient is frustrated but cooperative.    Speech is of normal rate     Thought process: generally tangential with thought blocking  Thought content: patient has delusions of family members being possessed by demons or imposters. Also with delusions of thought broadcast/transmission  Perception:  Hearing voices that someone is "getting information" from him and that "bad things will happen"  Mood: Describes as "tired".  Affect: flat.    Patient denies active suicidal ideation, intention and plan.    Patient denies active aggressive/homicidal ideation, intent or plan.   Patient is Alert and oriented .   Patient is cognitively grossly intact.   Fund of knowledge is fair. Memory is intact  Insight and judgment are limited. Impulse control is intact at this time.    Vital signs are stable.    On exam today the patient is frustrated but cooperative.    Speech is of normal rate     Thought process: generally tangential with thought blocking  Thought content: patient has delusions of family members being possessed by demons or imposters. Also with delusions of thought broadcast/transmission  Perception:  Hearing voices that someone is "getting information" from him and that "bad things will happen"  Mood: Describes as "OK".  Affect: flat.    Patient denies active suicidal ideation, intention and plan.    Patient denies active aggressive/homicidal ideation, intent or plan.   Patient is Alert and oriented .   Patient is cognitively grossly intact.   Fund of knowledge is fair. Memory is intact  Insight and judgment are limited. Impulse control is intact at this time.    Vital signs are stable.

## 2023-09-14 NOTE — BH INPATIENT PSYCHIATRY PROGRESS NOTE - NSBHMETABOLIC_PSY_ALL_CORE_FT
BMI: BMI (kg/m2): 27.1 (09-12-23 @ 16:35)  HbA1c: A1C with Estimated Average Glucose Result: 5.4 % (09-13-23 @ 09:30)    Glucose:   BP: 126/79 (09-13-23 @ 06:35) (126/79 - 126/79)  Lipid Panel: Date/Time: 09-13-23 @ 09:30  Cholesterol, Serum: 150  Direct LDL: --  HDL Cholesterol, Serum: 34  Total Cholesterol/HDL Ration Measurement: --  Triglycerides, Serum: 173

## 2023-09-15 RX ORDER — ARIPIPRAZOLE 15 MG/1
400 TABLET ORAL
Qty: 1 | Refills: 3
Start: 2023-09-15

## 2023-09-15 RX ORDER — RISPERIDONE 4 MG/1
1 TABLET ORAL
Qty: 30 | Refills: 0
Start: 2023-09-15 | End: 2023-10-14

## 2023-09-15 RX ADMIN — Medication 50 MILLIGRAM(S): at 20:14

## 2023-09-15 RX ADMIN — ARIPIPRAZOLE 5 MILLIGRAM(S): 15 TABLET ORAL at 20:14

## 2023-09-15 RX ADMIN — ARIPIPRAZOLE 2 MILLIGRAM(S): 15 TABLET ORAL at 08:32

## 2023-09-15 NOTE — BH INPATIENT PSYCHIATRY PROGRESS NOTE - PRN MEDS
MEDICATIONS  (PRN):  acetaminophen     Tablet .. 650 milliGRAM(s) Oral every 6 hours PRN Temp greater or equal to 38C (100.4F), Mild Pain (1 - 3)  aluminum hydroxide/magnesium hydroxide/simethicone Suspension 30 milliLiter(s) Oral every 6 hours PRN Dyspepsia  diphenhydrAMINE 50 milliGRAM(s) Oral every 6 hours PRN Extrapyramidal prophylaxis or symptoms  diphenhydrAMINE Injectable 50 milliGRAM(s) IntraMuscular once PRN Extrapyramidal prophylaxis  haloperidol    Injectable 5 milliGRAM(s) IntraMuscular once PRN severe agitation  hemorrhoidal Ointment 1 Application(s) Rectal every 6 hours PRN rectal irritation  LORazepam     Tablet 2 milliGRAM(s) Oral every 2 hours PRN CIWA score 8 to 11  LORazepam     Tablet 1 milliGRAM(s) Oral every 6 hours PRN anxiety  LORazepam   Injectable 2 milliGRAM(s) IntraMuscular once PRN severe anxiousness  LORazepam   Injectable 2 milliGRAM(s) IntraMuscular every 2 hours PRN CIWA score 12 or higher  magnesium hydroxide Suspension 30 milliLiter(s) Oral daily PRN Constipation  OLANZapine 5 milliGRAM(s) Oral every 4 hours PRN agitation  polyethylene glycol 3350 17 Gram(s) Oral daily PRN constipation  traZODone 50 milliGRAM(s) Oral at bedtime PRN insomnia

## 2023-09-15 NOTE — BH INPATIENT PSYCHIATRY PROGRESS NOTE - MSE UNSTRUCTURED FT
On exam today the patient is cooperative with good eye contact   Speech is of normal rate     Thought process: generally tangential with thought blocking  Thought content: patient has delusions of family members being possessed by demons or imposters. Also with delusions of thought broadcast/transmission  Perception:  Hearing voices that someone is "getting information" from him and that "bad things will happen"  Mood: Describes as "different".  Affect: flat.    Patient denies active suicidal ideation, intention and plan.    Patient denies active aggressive/homicidal ideation, intent or plan.   Patient is Alert and oriented .   Patient is cognitively grossly intact.   Fund of knowledge is fair. Memory is intact  Insight and judgment are limited. Impulse control is intact at this time.    Vital signs are stable.

## 2023-09-15 NOTE — BH INPATIENT PSYCHIATRY PROGRESS NOTE - NSBHFUPINTERVALHXFT_PSY_A_CORE
Patient admitted for psychotic episode secondary to cannabis use   Chart reviewed and discussed with team    [facility]  Has history of 4 previous pt seen for follow up of psychotic symptoms.  Patient remains with thought disorganization but has been in behavioral control and taking medications admissions, 1 at [place], 1 at [facility] and 2 at [address]   Patient states that his family visited again last night, states he still had feelings that they might not be "who they say they are" but not as much. Patint states that he is overall feeling less paranoid. Patient asked if writers have ever seen a "rainbow around the sun." Patient states he's interested in learning more about the earth. Patient states that he has been feeling restless and "feels like moving around." Patient states that he has had an upset stomach and has been unable to eat. Discussed with patient that it could be a side effect of medication and will continue to monitor. Spoke to parents on phone, stated that they thought he was improved after visiting last night. Parents stated that he was doing well prior to admission and that they attempted to make him understand that cannabis use causes his symptoms but patient continues to use. Parents informed that patient will be kept despite three day letter, family agrees patient should be kept until he returns to baseline. Discussed again with patient his three day letter and that it is unlikely he will be discharged as he is still exhibiting severe symptoms and remains too ill to safely be discharged to any lower level of care at this time. Patient admitted for psychotic episode secondary to cannabis use   Chart reviewed and discussed with team    pt seen for follow up of psychotic symptoms.    Patient remains with thought disorganization but has been in behavioral control and taking medications    Patient states that his family visited again last night, states he still had feelings that they might not be "who they say they are" but not as much. Patient states that he is overall feeling less paranoid. Patient asked if writers have ever seen a "rainbow around the sun." Patient states he's interested in learning more about the earth. Patient states that he has been feeling restless and "feels like moving around." Patient states that he has had an upset stomach and has been unable to eat. Discussed with patient that it could be a side effect of medication and will continue to monitor. Spoke to parents on phone, stated that they thought he was improved after visiting last night. Parents stated that he was doing well prior to admission and that they attempted to make him understand that cannabis use causes his symptoms but patient continues to use. Parents informed that patient will be kept despite three day letter, family agrees patient should be kept until he returns to baseline. Discussed again with patient his three day letter and that it is unlikely he will be discharged as he is still exhibiting severe symptoms and remains too ill to safely be discharged to any lower level of care at this time.

## 2023-09-15 NOTE — BH INPATIENT PSYCHIATRY PROGRESS NOTE - NSBHASSESSSUMMFT_PSY_ALL_CORE
18 year old male domiciled with family, PPS of psychosis, hx of multiple admissions since 2022, hx of SA 3 years ago, cannabis use disorder, no hx of violence, no PMHx, presented with worsening cannabis induced-psychosis and paranoia since 08/30.       Updates 09/15  Patient cooperative on examination but delusional and with   Has submitted a 72 hour letter for his discharge  Patient aware discharge unlikely due to continued psychosis   Family amenable to extension of inpatient treatment and SERRATO use    PLAN  Patient requires acute inpatient care for treatment of psychosis  Patient admitted on a 913 voluntary status but submitted 3 day letter on 9/12  Patient too ill to be discharged to  a lower level of care at this time and will be retained on his 72 hour letter on 9/15  Patient does not require constant observation at this time and will follow with routine checks   Aripiprazole 2 AM and 5 mg HS  Goal of transition to SERRATO  Treatment will include individual therapy/supportive therapy/rehab therapy/ psychopharmacological therapy and milieu therapy  18 year old male domiciled with family, PPS of psychosis, hx of multiple admissions since 2022, hx of SA 3 years ago, cannabis use disorder, no hx of violence, no PMHx, presented with worsening cannabis induced-psychosis and paranoia since 08/30.       Updates 09/15  Patient cooperative on examination but delusional and with   Has submitted a 72 hour letter for his discharge  Patient aware that he will not be discharged due to continued psychosis   Family amenable to extension of inpatient treatment and SERRATO use    PLAN  Patient requires acute inpatient care for treatment of psychosis  Patient admitted on a 913 voluntary status but submitted 3 day letter on 9/12  Patient too ill to be discharged to  a lower level of care at this time and will be retained on his 72 hour letter on 9/15  Patient does not require constant observation at this time and will follow with routine checks   Aripiprazole 2 AM and 5 mg HS  Goal of transition to SERRATO  Treatment will include individual therapy/supportive therapy/rehab therapy/ psychopharmacological therapy and milieu therapy

## 2023-09-15 NOTE — BH INPATIENT PSYCHIATRY PROGRESS NOTE - CURRENT MEDICATION
MEDICATIONS  (STANDING):  ARIPiprazole 2 milliGRAM(s) Oral daily  ARIPiprazole 5 milliGRAM(s) Oral at bedtime  influenza   Vaccine 0.5 milliLiter(s) IntraMuscular once    MEDICATIONS  (PRN):  acetaminophen     Tablet .. 650 milliGRAM(s) Oral every 6 hours PRN Temp greater or equal to 38C (100.4F), Mild Pain (1 - 3)  aluminum hydroxide/magnesium hydroxide/simethicone Suspension 30 milliLiter(s) Oral every 6 hours PRN Dyspepsia  diphenhydrAMINE 50 milliGRAM(s) Oral every 6 hours PRN Extrapyramidal prophylaxis or symptoms  diphenhydrAMINE Injectable 50 milliGRAM(s) IntraMuscular once PRN Extrapyramidal prophylaxis  haloperidol    Injectable 5 milliGRAM(s) IntraMuscular once PRN severe agitation  hemorrhoidal Ointment 1 Application(s) Rectal every 6 hours PRN rectal irritation  LORazepam     Tablet 2 milliGRAM(s) Oral every 2 hours PRN CIWA score 8 to 11  LORazepam     Tablet 1 milliGRAM(s) Oral every 6 hours PRN anxiety  LORazepam   Injectable 2 milliGRAM(s) IntraMuscular once PRN severe anxiousness  LORazepam   Injectable 2 milliGRAM(s) IntraMuscular every 2 hours PRN CIWA score 12 or higher  magnesium hydroxide Suspension 30 milliLiter(s) Oral daily PRN Constipation  OLANZapine 5 milliGRAM(s) Oral every 4 hours PRN agitation  polyethylene glycol 3350 17 Gram(s) Oral daily PRN constipation  traZODone 50 milliGRAM(s) Oral at bedtime PRN insomnia

## 2023-09-16 PROCEDURE — 99233 SBSQ HOSP IP/OBS HIGH 50: CPT

## 2023-09-16 RX ORDER — NICOTINE POLACRILEX 2 MG
2 GUM BUCCAL
Refills: 0 | Status: DISCONTINUED | OUTPATIENT
Start: 2023-09-16 | End: 2023-09-22

## 2023-09-16 RX ORDER — NICOTINE POLACRILEX 2 MG
1 GUM BUCCAL DAILY
Refills: 0 | Status: DISCONTINUED | OUTPATIENT
Start: 2023-09-16 | End: 2023-09-22

## 2023-09-16 RX ADMIN — ARIPIPRAZOLE 2 MILLIGRAM(S): 15 TABLET ORAL at 08:35

## 2023-09-16 RX ADMIN — Medication 2 MILLIGRAM(S): at 17:05

## 2023-09-16 RX ADMIN — ARIPIPRAZOLE 5 MILLIGRAM(S): 15 TABLET ORAL at 20:02

## 2023-09-16 NOTE — BH INPATIENT PSYCHIATRY PROGRESS NOTE - NSBHMETABOLIC_PSY_ALL_CORE_FT
BMI: BMI (kg/m2): 27.1 (09-12-23 @ 16:35)  HbA1c: A1C with Estimated Average Glucose Result: 5.4 % (09-13-23 @ 09:30)    Glucose:   BP: --  Lipid Panel: Date/Time: 09-13-23 @ 09:30  Cholesterol, Serum: 150  Direct LDL: --  HDL Cholesterol, Serum: 34  Total Cholesterol/HDL Ration Measurement: --  Triglycerides, Serum: 173

## 2023-09-16 NOTE — BH INPATIENT PSYCHIATRY PROGRESS NOTE - NSBHFUPINTERVALHXFT_PSY_A_CORE
Patient admitted for psychotic episode secondary to cannabis use   Chart reviewed and discussed with nursing team    pt seen for follow up of psychotic symptoms.    Patient remains with thought disorganization but has been in behavioral control and taking medications    Patient states that his family visited again last night, states he still had feelings that they might not be "who they say they are" but not as much  He is adherent to the Abilify 2mg daily and feels that he is feeling more like himself.

## 2023-09-16 NOTE — BH INPATIENT PSYCHIATRY PROGRESS NOTE - CURRENT MEDICATION
MEDICATIONS  (STANDING):  ARIPiprazole 2 milliGRAM(s) Oral daily  ARIPiprazole 5 milliGRAM(s) Oral at bedtime  influenza   Vaccine 0.5 milliLiter(s) IntraMuscular once  nicotine -   7 mG/24Hr(s) Patch 1 Patch Transdermal daily    MEDICATIONS  (PRN):  acetaminophen     Tablet .. 650 milliGRAM(s) Oral every 6 hours PRN Temp greater or equal to 38C (100.4F), Mild Pain (1 - 3)  aluminum hydroxide/magnesium hydroxide/simethicone Suspension 30 milliLiter(s) Oral every 6 hours PRN Dyspepsia  diphenhydrAMINE 50 milliGRAM(s) Oral every 6 hours PRN Extrapyramidal prophylaxis or symptoms  diphenhydrAMINE Injectable 50 milliGRAM(s) IntraMuscular once PRN Extrapyramidal prophylaxis  haloperidol    Injectable 5 milliGRAM(s) IntraMuscular once PRN severe agitation  hemorrhoidal Ointment 1 Application(s) Rectal every 6 hours PRN rectal irritation  LORazepam     Tablet 1 milliGRAM(s) Oral every 6 hours PRN anxiety  LORazepam     Tablet 2 milliGRAM(s) Oral every 2 hours PRN CIWA score 8 to 11  LORazepam   Injectable 2 milliGRAM(s) IntraMuscular once PRN severe anxiousness  LORazepam   Injectable 2 milliGRAM(s) IntraMuscular every 2 hours PRN CIWA score 12 or higher  magnesium hydroxide Suspension 30 milliLiter(s) Oral daily PRN Constipation  nicotine  Polacrilex Gum 2 milliGRAM(s) Oral every 2 hours PRN nicotine dependence  OLANZapine 5 milliGRAM(s) Oral every 4 hours PRN agitation  polyethylene glycol 3350 17 Gram(s) Oral daily PRN constipation  traZODone 50 milliGRAM(s) Oral at bedtime PRN insomnia

## 2023-09-16 NOTE — BH INPATIENT PSYCHIATRY PROGRESS NOTE - NSBHASSESSSUMMFT_PSY_ALL_CORE
18 year old male domiciled with family, PPS of psychosis, hx of multiple admissions since 2022, hx of SA 3 years ago, cannabis use disorder, no hx of violence, no PMHx, presented with worsening cannabis induced-psychosis and paranoia since 08/30.       Updates 09/15  Patient cooperative on examination but delusional and with   Has submitted a 72 hour letter for his discharge  Patient aware that he will not be discharged due to continued psychosis   Family amenable to extension of inpatient treatment and SERRATO use    PLAN  Patient requires acute inpatient care for treatment of psychosis  Patient admitted on a 913 voluntary status but submitted 3 day letter on 9/12  Patient too ill to be discharged to  a lower level of care at this time and will be retained on his 72 hour letter on 9/15  Patient does not require constant observation at this time and will follow with routine checks   Aripiprazole 2 AM and 5 mg HS  Goal of transition to SERRATO  Treatment will include individual therapy/supportive therapy/rehab therapy/ psychopharmacological therapy and milieu therapy

## 2023-09-16 NOTE — BH INPATIENT PSYCHIATRY PROGRESS NOTE - PRN MEDS
MEDICATIONS  (PRN):  acetaminophen     Tablet .. 650 milliGRAM(s) Oral every 6 hours PRN Temp greater or equal to 38C (100.4F), Mild Pain (1 - 3)  aluminum hydroxide/magnesium hydroxide/simethicone Suspension 30 milliLiter(s) Oral every 6 hours PRN Dyspepsia  diphenhydrAMINE 50 milliGRAM(s) Oral every 6 hours PRN Extrapyramidal prophylaxis or symptoms  diphenhydrAMINE Injectable 50 milliGRAM(s) IntraMuscular once PRN Extrapyramidal prophylaxis  haloperidol    Injectable 5 milliGRAM(s) IntraMuscular once PRN severe agitation  hemorrhoidal Ointment 1 Application(s) Rectal every 6 hours PRN rectal irritation  LORazepam     Tablet 1 milliGRAM(s) Oral every 6 hours PRN anxiety  LORazepam     Tablet 2 milliGRAM(s) Oral every 2 hours PRN CIWA score 8 to 11  LORazepam   Injectable 2 milliGRAM(s) IntraMuscular once PRN severe anxiousness  LORazepam   Injectable 2 milliGRAM(s) IntraMuscular every 2 hours PRN CIWA score 12 or higher  magnesium hydroxide Suspension 30 milliLiter(s) Oral daily PRN Constipation  nicotine  Polacrilex Gum 2 milliGRAM(s) Oral every 2 hours PRN nicotine dependence  OLANZapine 5 milliGRAM(s) Oral every 4 hours PRN agitation  polyethylene glycol 3350 17 Gram(s) Oral daily PRN constipation  traZODone 50 milliGRAM(s) Oral at bedtime PRN insomnia

## 2023-09-17 RX ADMIN — Medication 2 MILLIGRAM(S): at 08:39

## 2023-09-17 RX ADMIN — Medication 1 PATCH: at 08:38

## 2023-09-17 RX ADMIN — ARIPIPRAZOLE 5 MILLIGRAM(S): 15 TABLET ORAL at 20:19

## 2023-09-17 RX ADMIN — Medication 2 MILLIGRAM(S): at 15:36

## 2023-09-17 RX ADMIN — Medication 1 MILLIGRAM(S): at 00:12

## 2023-09-17 RX ADMIN — Medication 50 MILLIGRAM(S): at 00:12

## 2023-09-17 RX ADMIN — ARIPIPRAZOLE 2 MILLIGRAM(S): 15 TABLET ORAL at 08:38

## 2023-09-17 RX ADMIN — OLANZAPINE 5 MILLIGRAM(S): 15 TABLET, FILM COATED ORAL at 13:57

## 2023-09-17 RX ADMIN — Medication 50 MILLIGRAM(S): at 20:32

## 2023-09-18 RX ORDER — HALOPERIDOL DECANOATE 100 MG/ML
10 INJECTION INTRAMUSCULAR AT BEDTIME
Refills: 0 | Status: DISCONTINUED | OUTPATIENT
Start: 2023-09-18 | End: 2023-09-22

## 2023-09-18 RX ORDER — OLANZAPINE 15 MG/1
405 TABLET, FILM COATED ORAL
Qty: 1 | Refills: 0
Start: 2023-09-18

## 2023-09-18 RX ORDER — TRAZODONE HCL 50 MG
100 TABLET ORAL AT BEDTIME
Refills: 0 | Status: DISCONTINUED | OUTPATIENT
Start: 2023-09-18 | End: 2023-09-22

## 2023-09-18 RX ORDER — BENZTROPINE MESYLATE 1 MG
1 TABLET ORAL
Refills: 0 | Status: DISCONTINUED | OUTPATIENT
Start: 2023-09-18 | End: 2023-09-22

## 2023-09-18 RX ADMIN — Medication 650 MILLIGRAM(S): at 11:52

## 2023-09-18 RX ADMIN — Medication 1 PATCH: at 08:02

## 2023-09-18 RX ADMIN — HALOPERIDOL DECANOATE 10 MILLIGRAM(S): 100 INJECTION INTRAMUSCULAR at 20:05

## 2023-09-18 RX ADMIN — ARIPIPRAZOLE 2 MILLIGRAM(S): 15 TABLET ORAL at 08:02

## 2023-09-18 RX ADMIN — Medication 1 MILLIGRAM(S): at 20:06

## 2023-09-18 RX ADMIN — Medication 100 MILLIGRAM(S): at 20:05

## 2023-09-18 RX ADMIN — Medication 1 PATCH: at 09:05

## 2023-09-18 RX ADMIN — Medication 650 MILLIGRAM(S): at 11:16

## 2023-09-18 NOTE — BH INPATIENT PSYCHIATRY PROGRESS NOTE - NSBHASSESSSUMMFT_PSY_ALL_CORE
18 year old male domiciled with family, PPS of psychosis, hx of multiple admissions since 2022, hx of SA 3 years ago, cannabis use disorder, no hx of violence, no PMHx, presented with worsening cannabis induced-psychosis and paranoia since 08/30.       Updates 09/18  Patient cooperative on examination with decreased delusions and AH  Patient reports decreased sleep   Tolerating medications well   Family amenable to extension of inpatient treatment and SERRATO use    PLAN  Patient requires acute inpatient care for treatment of psychosis  Patient admitted on a 913 voluntary status but submitted 3 day letter on 9/12  Patient too ill to be discharged to  a lower level of care at this time and will be retained on his 72 hour letter on 9/15  Patient does not require constant observation at this time and will follow with routine checks   Switch patient from Aripiprazole 2 AM and 5 mg HS to Olanzapine 10 mg   Goal of transition to SERRATO  Treatment will include individual therapy/supportive therapy/rehab therapy/ psychopharmacological therapy and milieu therapy  18 year old male domiciled with family, PPS of psychosis, hx of multiple admissions since 2022, hx of SA 3 years ago, cannabis use disorder, no hx of violence, no PMHx, presented with worsening cannabis induced-psychosis and paranoia since 08/30.       Updates 09/18  Patient cooperative on examination with decreased delusions and AH  Patient reports decreased sleep   Tolerating medications well   Family amenable to extension of inpatient treatment and SERRATO use but SERRATO of [facility] cost prohibitive and will need to switch to [facility] for SERRATO    PLAN  Patient requires acute inpatient care for treatment of psychosis  Patient admitted on a 913 voluntary status but submitted 3 day letter on 9/12  Patient too ill to be discharged to  a lower level of care at this time and will be retained on his 72 hour letter on 9/15  Patient does not require constant observation at this time and will follow with routine checks   Switch patient from Aripiprazole 2 AM and 5 mg HS to Haloperidol 10 mg with Benztropine 1 mg BID  Goal of transition to SERRATO  Change  trazodone to 100 mg HS standing for insomnia   Treatment will include individual therapy/supportive therapy/rehab therapy/ psychopharmacological therapy and milieu therapy

## 2023-09-18 NOTE — BH INPATIENT PSYCHIATRY PROGRESS NOTE - MSE UNSTRUCTURED FT
On exam today the patient is cooperative with good eye contact   Speech is of normal rate     Thought process: generally tangential  Thought content: patient with decreased delusions of family members being possessed by demons or imposters. Decreased delusions of thought broadcast/transmission  Perception:  Denies active AH but previously heard voices that someone is "getting information" from him and that "bad things will happen"  Mood: Describes as "less paranoid".  Affect: flat.    Patient denies active suicidal ideation, intention and plan.    Patient denies active aggressive/homicidal ideation, intent or plan.   Patient is Alert and oriented .   Patient is cognitively grossly intact.   Fund of knowledge is fair. Memory is intact  Insight and judgment are limited. Impulse control is intact at this time.    Vital signs are stable.    On exam today the patient is cooperative with good eye contact   Speech is of normal rate     Thought process: g  Thought Process: Less tangential but still not fully linear  Thought content: patient with decreased delusions of family members being possessed by demons or imposters. Decreased delusions of thought broadcast/transmission  Perception:  Denies active AH but previously heard voices that someone is "getting information" from him and that "bad things will happen"  Mood: Describes as "less paranoid".  Affect: flat.    Patient denies active suicidal ideation, intention and plan.    Patient denies active aggressive/homicidal ideation, intent or plan.   Patient is Alert and oriented .   Patient is cognitively grossly intact.   Fund of knowledge is fair. Memory is intact  Insight and judgment are limited. Impulse control is intact at this time.    Vital signs are stable.

## 2023-09-18 NOTE — BH INPATIENT PSYCHIATRY PROGRESS NOTE - NSBHFUPINTERVALHXFT_PSY_A_CORE
Patient admitted for psychotic episode secondary to cannabis use   Chart reviewed and discussed with nursing team    pt seen for follow up of psychotic symptoms.   Patient remains with mild thought disorganization but has been in behavioral control and taking medications    Patient states that his family visited again this weekend, states that they are "a little different" but not as much and that changes are positive. Patient says side effects of stomach ache with Aripiprazole have subsided. Discussed with patient goal of switching to SERRATO eventually, but pt has limited coverage of aripiprazole SERRATO. Patient states he hasn't slept well over the weekend and is interested in switching medications to improve sleep. Patient states he is open to returning to Risperidone or trying Olanzapine. Patient denies SI, states there is some hopelessness due to not knowing how treatment will progress.

## 2023-09-18 NOTE — BH INPATIENT PSYCHIATRY PROGRESS NOTE - PRN MEDS
MEDICATIONS  (PRN):  acetaminophen     Tablet .. 650 milliGRAM(s) Oral every 6 hours PRN Temp greater or equal to 38C (100.4F), Mild Pain (1 - 3)  aluminum hydroxide/magnesium hydroxide/simethicone Suspension 30 milliLiter(s) Oral every 6 hours PRN Dyspepsia  diphenhydrAMINE 50 milliGRAM(s) Oral every 6 hours PRN Extrapyramidal prophylaxis or symptoms  diphenhydrAMINE Injectable 50 milliGRAM(s) IntraMuscular once PRN Extrapyramidal prophylaxis  haloperidol    Injectable 5 milliGRAM(s) IntraMuscular once PRN severe agitation  hemorrhoidal Ointment 1 Application(s) Rectal every 6 hours PRN rectal irritation  LORazepam     Tablet 2 milliGRAM(s) Oral every 2 hours PRN CIWA score 8 to 11  LORazepam     Tablet 1 milliGRAM(s) Oral every 6 hours PRN anxiety  LORazepam   Injectable 2 milliGRAM(s) IntraMuscular once PRN severe anxiousness  LORazepam   Injectable 2 milliGRAM(s) IntraMuscular every 2 hours PRN CIWA score 12 or higher  magnesium hydroxide Suspension 30 milliLiter(s) Oral daily PRN Constipation  nicotine  Polacrilex Gum 2 milliGRAM(s) Oral every 2 hours PRN nicotine dependence  OLANZapine 5 milliGRAM(s) Oral every 4 hours PRN agitation  polyethylene glycol 3350 17 Gram(s) Oral daily PRN constipation  traZODone 50 milliGRAM(s) Oral at bedtime PRN insomnia   MEDICATIONS  (PRN):  acetaminophen     Tablet .. 650 milliGRAM(s) Oral every 6 hours PRN Temp greater or equal to 38C (100.4F), Mild Pain (1 - 3)  aluminum hydroxide/magnesium hydroxide/simethicone Suspension 30 milliLiter(s) Oral every 6 hours PRN Dyspepsia  diphenhydrAMINE 50 milliGRAM(s) Oral every 6 hours PRN Extrapyramidal prophylaxis or symptoms  diphenhydrAMINE Injectable 50 milliGRAM(s) IntraMuscular once PRN Extrapyramidal prophylaxis  haloperidol    Injectable 5 milliGRAM(s) IntraMuscular once PRN severe agitation  hemorrhoidal Ointment 1 Application(s) Rectal every 6 hours PRN rectal irritation  LORazepam     Tablet 1 milliGRAM(s) Oral every 6 hours PRN anxiety  LORazepam     Tablet 2 milliGRAM(s) Oral every 2 hours PRN CIWA score 8 to 11  LORazepam   Injectable 2 milliGRAM(s) IntraMuscular once PRN severe anxiousness  LORazepam   Injectable 2 milliGRAM(s) IntraMuscular every 2 hours PRN CIWA score 12 or higher  magnesium hydroxide Suspension 30 milliLiter(s) Oral daily PRN Constipation  nicotine  Polacrilex Gum 2 milliGRAM(s) Oral every 2 hours PRN nicotine dependence  OLANZapine 5 milliGRAM(s) Oral every 4 hours PRN agitation  polyethylene glycol 3350 17 Gram(s) Oral daily PRN constipation

## 2023-09-18 NOTE — BH INPATIENT PSYCHIATRY PROGRESS NOTE - CURRENT MEDICATION
MEDICATIONS  (STANDING):  ARIPiprazole 2 milliGRAM(s) Oral daily  ARIPiprazole 5 milliGRAM(s) Oral at bedtime  influenza   Vaccine 0.5 milliLiter(s) IntraMuscular once  nicotine -   7 mG/24Hr(s) Patch 1 Patch Transdermal daily    MEDICATIONS  (PRN):  acetaminophen     Tablet .. 650 milliGRAM(s) Oral every 6 hours PRN Temp greater or equal to 38C (100.4F), Mild Pain (1 - 3)  aluminum hydroxide/magnesium hydroxide/simethicone Suspension 30 milliLiter(s) Oral every 6 hours PRN Dyspepsia  diphenhydrAMINE 50 milliGRAM(s) Oral every 6 hours PRN Extrapyramidal prophylaxis or symptoms  diphenhydrAMINE Injectable 50 milliGRAM(s) IntraMuscular once PRN Extrapyramidal prophylaxis  haloperidol    Injectable 5 milliGRAM(s) IntraMuscular once PRN severe agitation  hemorrhoidal Ointment 1 Application(s) Rectal every 6 hours PRN rectal irritation  LORazepam     Tablet 2 milliGRAM(s) Oral every 2 hours PRN CIWA score 8 to 11  LORazepam     Tablet 1 milliGRAM(s) Oral every 6 hours PRN anxiety  LORazepam   Injectable 2 milliGRAM(s) IntraMuscular once PRN severe anxiousness  LORazepam   Injectable 2 milliGRAM(s) IntraMuscular every 2 hours PRN CIWA score 12 or higher  magnesium hydroxide Suspension 30 milliLiter(s) Oral daily PRN Constipation  nicotine  Polacrilex Gum 2 milliGRAM(s) Oral every 2 hours PRN nicotine dependence  OLANZapine 5 milliGRAM(s) Oral every 4 hours PRN agitation  polyethylene glycol 3350 17 Gram(s) Oral daily PRN constipation  traZODone 50 milliGRAM(s) Oral at bedtime PRN insomnia   MEDICATIONS  (STANDING):  benztropine 1 milliGRAM(s) Oral two times a day  haloperidol     Tablet 10 milliGRAM(s) Oral at bedtime  influenza   Vaccine 0.5 milliLiter(s) IntraMuscular once  nicotine -   7 mG/24Hr(s) Patch 1 Patch Transdermal daily  traZODone 100 milliGRAM(s) Oral at bedtime    MEDICATIONS  (PRN):  acetaminophen     Tablet .. 650 milliGRAM(s) Oral every 6 hours PRN Temp greater or equal to 38C (100.4F), Mild Pain (1 - 3)  aluminum hydroxide/magnesium hydroxide/simethicone Suspension 30 milliLiter(s) Oral every 6 hours PRN Dyspepsia  diphenhydrAMINE 50 milliGRAM(s) Oral every 6 hours PRN Extrapyramidal prophylaxis or symptoms  diphenhydrAMINE Injectable 50 milliGRAM(s) IntraMuscular once PRN Extrapyramidal prophylaxis  haloperidol    Injectable 5 milliGRAM(s) IntraMuscular once PRN severe agitation  hemorrhoidal Ointment 1 Application(s) Rectal every 6 hours PRN rectal irritation  LORazepam     Tablet 1 milliGRAM(s) Oral every 6 hours PRN anxiety  LORazepam     Tablet 2 milliGRAM(s) Oral every 2 hours PRN CIWA score 8 to 11  LORazepam   Injectable 2 milliGRAM(s) IntraMuscular once PRN severe anxiousness  LORazepam   Injectable 2 milliGRAM(s) IntraMuscular every 2 hours PRN CIWA score 12 or higher  magnesium hydroxide Suspension 30 milliLiter(s) Oral daily PRN Constipation  nicotine  Polacrilex Gum 2 milliGRAM(s) Oral every 2 hours PRN nicotine dependence  OLANZapine 5 milliGRAM(s) Oral every 4 hours PRN agitation  polyethylene glycol 3350 17 Gram(s) Oral daily PRN constipation

## 2023-09-19 PROCEDURE — 99232 SBSQ HOSP IP/OBS MODERATE 35: CPT

## 2023-09-19 RX ORDER — HALOPERIDOL DECANOATE 100 MG/ML
50 INJECTION INTRAMUSCULAR ONCE
Refills: 0 | Status: COMPLETED | OUTPATIENT
Start: 2023-09-20 | End: 2023-09-20

## 2023-09-19 RX ADMIN — Medication 1 MILLIGRAM(S): at 08:58

## 2023-09-19 RX ADMIN — Medication 1 PATCH: at 08:58

## 2023-09-19 RX ADMIN — Medication 100 MILLIGRAM(S): at 20:18

## 2023-09-19 RX ADMIN — Medication 1 MILLIGRAM(S): at 20:19

## 2023-09-19 RX ADMIN — HALOPERIDOL DECANOATE 10 MILLIGRAM(S): 100 INJECTION INTRAMUSCULAR at 20:19

## 2023-09-19 RX ADMIN — Medication 650 MILLIGRAM(S): at 11:11

## 2023-09-19 RX ADMIN — Medication 1 PATCH: at 09:07

## 2023-09-19 RX ADMIN — Medication 650 MILLIGRAM(S): at 12:11

## 2023-09-19 RX ADMIN — Medication 1 MILLIGRAM(S): at 20:18

## 2023-09-19 NOTE — BH TREATMENT PLAN - NSCMSPTSTRENGTHS_PSY_ALL_CORE
Intact family/Physically healthy/Supportive family
Intact family/Physically healthy/Supportive family

## 2023-09-19 NOTE — BH INPATIENT PSYCHIATRY PROGRESS NOTE - PRN MEDS
MEDICATIONS  (PRN):  acetaminophen     Tablet .. 650 milliGRAM(s) Oral every 6 hours PRN Temp greater or equal to 38C (100.4F), Mild Pain (1 - 3)  aluminum hydroxide/magnesium hydroxide/simethicone Suspension 30 milliLiter(s) Oral every 6 hours PRN Dyspepsia  diphenhydrAMINE 50 milliGRAM(s) Oral every 6 hours PRN Extrapyramidal prophylaxis or symptoms  diphenhydrAMINE Injectable 50 milliGRAM(s) IntraMuscular once PRN Extrapyramidal prophylaxis  haloperidol    Injectable 5 milliGRAM(s) IntraMuscular once PRN severe agitation  hemorrhoidal Ointment 1 Application(s) Rectal every 6 hours PRN rectal irritation  LORazepam     Tablet 1 milliGRAM(s) Oral every 6 hours PRN anxiety  LORazepam     Tablet 2 milliGRAM(s) Oral every 2 hours PRN CIWA score 8 to 11  LORazepam   Injectable 2 milliGRAM(s) IntraMuscular once PRN severe anxiousness  LORazepam   Injectable 2 milliGRAM(s) IntraMuscular every 2 hours PRN CIWA score 12 or higher  magnesium hydroxide Suspension 30 milliLiter(s) Oral daily PRN Constipation  nicotine  Polacrilex Gum 2 milliGRAM(s) Oral every 2 hours PRN nicotine dependence  OLANZapine 5 milliGRAM(s) Oral every 4 hours PRN agitation  polyethylene glycol 3350 17 Gram(s) Oral daily PRN constipation

## 2023-09-19 NOTE — BH TREATMENT PLAN - NSTXDISORGGOALOTHER_PSY_ALL_CORE
Patient will be less symptomatic and stable for discharge with CGI improvement score of 2
Patient will be less symptomatic and stable for discharge with CGI improvement score of 2

## 2023-09-19 NOTE — BH INPATIENT PSYCHIATRY PROGRESS NOTE - CURRENT MEDICATION
MEDICATIONS  (STANDING):  benztropine 1 milliGRAM(s) Oral two times a day  haloperidol     Tablet 10 milliGRAM(s) Oral at bedtime  influenza   Vaccine 0.5 milliLiter(s) IntraMuscular once  nicotine -   7 mG/24Hr(s) Patch 1 Patch Transdermal daily  traZODone 100 milliGRAM(s) Oral at bedtime    MEDICATIONS  (PRN):  acetaminophen     Tablet .. 650 milliGRAM(s) Oral every 6 hours PRN Temp greater or equal to 38C (100.4F), Mild Pain (1 - 3)  aluminum hydroxide/magnesium hydroxide/simethicone Suspension 30 milliLiter(s) Oral every 6 hours PRN Dyspepsia  diphenhydrAMINE 50 milliGRAM(s) Oral every 6 hours PRN Extrapyramidal prophylaxis or symptoms  diphenhydrAMINE Injectable 50 milliGRAM(s) IntraMuscular once PRN Extrapyramidal prophylaxis  haloperidol    Injectable 5 milliGRAM(s) IntraMuscular once PRN severe agitation  hemorrhoidal Ointment 1 Application(s) Rectal every 6 hours PRN rectal irritation  LORazepam     Tablet 1 milliGRAM(s) Oral every 6 hours PRN anxiety  LORazepam     Tablet 2 milliGRAM(s) Oral every 2 hours PRN CIWA score 8 to 11  LORazepam   Injectable 2 milliGRAM(s) IntraMuscular once PRN severe anxiousness  LORazepam   Injectable 2 milliGRAM(s) IntraMuscular every 2 hours PRN CIWA score 12 or higher  magnesium hydroxide Suspension 30 milliLiter(s) Oral daily PRN Constipation  nicotine  Polacrilex Gum 2 milliGRAM(s) Oral every 2 hours PRN nicotine dependence  OLANZapine 5 milliGRAM(s) Oral every 4 hours PRN agitation  polyethylene glycol 3350 17 Gram(s) Oral daily PRN constipation

## 2023-09-19 NOTE — BH INPATIENT PSYCHIATRY PROGRESS NOTE - MSE UNSTRUCTURED FT
On exam today the patient is cooperative with good eye contact   Speech is of normal rate and rhythm  Thought Process: Less tangential but still not fully linear  Thought content: patient with decreased delusions of family members being possessed by demons or imposters. Decreased delusions of thought broadcast/transmission  Perception:  Denies active AH but previously heard voices that someone is "getting information" from him and that "bad things will happen"  Mood: Describes as "not as paranoid".  Affect: flat.    Patient denies active suicidal ideation, intention and plan.    Patient denies active aggressive/homicidal ideation, intent or plan.   Patient is Alert and oriented .   Patient is cognitively grossly intact.   Fund of knowledge is fair. Memory is intact  Insight and judgment are limited. Impulse control is intact at this time.    Vital signs are stable.

## 2023-09-19 NOTE — BH TREATMENT PLAN - NSTXPSYCHOINTERPR_PSY_ALL_CORE
Patient will benefit from engaging in individual and group sessions in order to identify and utilize coping skills for improved symptom management by day seven. Psychiatric Rehabilitation staff will engage patient daily in order to assist patient in exploring and practicing coping strategies for better sxs management.
Patient will benefit from engaging in individual and group sessions in order to identify and utilize coping skills for improved symptom management by day seven. Psychiatric Rehabilitation staff will engage patient daily in order to assist patient in exploring and practicing coping strategies for better sxs management.

## 2023-09-19 NOTE — BH INPATIENT PSYCHIATRY PROGRESS NOTE - NSBHFUPINTERVALHXFT_PSY_A_CORE
Patient admitted for psychotic episode secondary to cannabis use   Chart reviewed and discussed with nursing team    pt seen for follow up of psychotic symptoms.   Patient remains with mild thought disorganization but has been in behavioral control and taking medications    Patient states that his family visited again last night, feels that he is less paranoid about them being clones but still has some suspicion that they are. Feels that his mom's behavior was "off" and she may not be who she says she is. Discussed with patient harms of cannabis on mental health, patient agreed to try to stop smoking for three months. Patient says he slept well with the trazodone. Patient says he is doing well with Haldol trial, is having mild fatigue and headaches but overall feels well. Patient denies SI or hopelessness.

## 2023-09-19 NOTE — BH INPATIENT PSYCHIATRY PROGRESS NOTE - NSBHASSESSSUMMFT_PSY_ALL_CORE
18 year old male domiciled with family, PPS of psychosis, hx of multiple admissions since 2022, hx of SA 3 years ago, cannabis use disorder, no hx of violence, no PMHx, presented with worsening cannabis induced-psychosis and paranoia since 08/30.       Updates 09/19  Patient cooperative on examination with decreased delusions and AH  Patient reports improved sleep with Trazodone  Mild headache and fatigue with switch to FGA Haldol  Family amenable to FGA SERRATO    PLAN  Patient requires acute inpatient care for treatment of psychosis  Patient admitted on a 913 voluntary status but submitted 3 day letter on 9/12  Patient too ill to be discharged to  a lower level of care at this time and will be retained on his 72 hour letter on 9/15  Patient does not require constant observation at this time and will follow with routine checks   Continue with 5 mg HS to Haloperidol 10 mg with Benztropine 1 mg BID  Goal of transition to FGA SERRATO  Contineu trazodone 100 mg HS standing for insomnia   Treatment will include individual therapy/supportive therapy/rehab therapy/ psychopharmacological therapy and milieu therapy

## 2023-09-19 NOTE — BH TREATMENT PLAN - NSTXDCOPNOINTERSW_PSY_ALL_CORE
SW reviewed EMR, met with pt and team, gained consents to speak with collateral.
SW reviewed EMR, met with pt and team, gained consents to speak with collateral.

## 2023-09-20 PROCEDURE — 99233 SBSQ HOSP IP/OBS HIGH 50: CPT

## 2023-09-20 RX ADMIN — HALOPERIDOL DECANOATE 50 MILLIGRAM(S): 100 INJECTION INTRAMUSCULAR at 09:29

## 2023-09-20 RX ADMIN — Medication 100 MILLIGRAM(S): at 20:17

## 2023-09-20 RX ADMIN — Medication 1 PATCH: at 08:22

## 2023-09-20 RX ADMIN — Medication 1 MILLIGRAM(S): at 20:17

## 2023-09-20 RX ADMIN — Medication 650 MILLIGRAM(S): at 14:56

## 2023-09-20 RX ADMIN — HALOPERIDOL DECANOATE 10 MILLIGRAM(S): 100 INJECTION INTRAMUSCULAR at 20:17

## 2023-09-20 RX ADMIN — Medication 1 MILLIGRAM(S): at 08:22

## 2023-09-20 NOTE — BH INPATIENT PSYCHIATRY PROGRESS NOTE - CURRENT MEDICATION
MEDICATIONS  (STANDING):  benztropine 1 milliGRAM(s) Oral two times a day  haloperidol     Tablet 10 milliGRAM(s) Oral at bedtime  influenza   Vaccine 0.5 milliLiter(s) IntraMuscular once  nicotine -   7 mG/24Hr(s) Patch 1 Patch Transdermal daily  traZODone 100 milliGRAM(s) Oral at bedtime    MEDICATIONS  (PRN):  acetaminophen     Tablet .. 650 milliGRAM(s) Oral every 6 hours PRN Temp greater or equal to 38C (100.4F), Mild Pain (1 - 3)  aluminum hydroxide/magnesium hydroxide/simethicone Suspension 30 milliLiter(s) Oral every 6 hours PRN Dyspepsia  diphenhydrAMINE 50 milliGRAM(s) Oral every 6 hours PRN Extrapyramidal prophylaxis or symptoms  diphenhydrAMINE Injectable 50 milliGRAM(s) IntraMuscular once PRN Extrapyramidal prophylaxis  haloperidol    Injectable 5 milliGRAM(s) IntraMuscular once PRN severe agitation  hemorrhoidal Ointment 1 Application(s) Rectal every 6 hours PRN rectal irritation  magnesium hydroxide Suspension 30 milliLiter(s) Oral daily PRN Constipation  nicotine  Polacrilex Gum 2 milliGRAM(s) Oral every 2 hours PRN nicotine dependence  OLANZapine 5 milliGRAM(s) Oral every 4 hours PRN agitation  polyethylene glycol 3350 17 Gram(s) Oral daily PRN constipation

## 2023-09-20 NOTE — BH DISCHARGE NOTE NURSING/SOCIAL WORK/PSYCH REHAB - NSDCPRGOAL_PSY_ALL_CORE
Writer met pt to review safety plan and discuss the pt’s progress throughout this hospitalization. Pt was able to complete safety plan with psych rehab staff’s engagement. Pt identified warning signs, coping skills, people that he can reach out for support, making environment safe and purpose of his life. Pt has made progress into his psych rehab goal of identifying coping skills for better sx management. Pt identified exercising, deep breathing, taking a nap and drinking tea as his coping strategies. Pt reported improvements in his mood and AH. Pt reported some anxiety about going back home but he is able to manage it by using coping strategies and utilizing support from certain family members such as his father and grandmother. Pt denies SI/HI/AH/VH. In this hospitalization, pt has demonstrated medication compliance. Pt is in fair behavioral control on the unit. Pt is calm and cooperative with staff.  Pt is able to verbalize his feelings, thoughts and reach out to staff for support. Pt attended 50% of psych rehab groups. Pt is able to appropriately participate in group discussions and activities.  Pt is encouraged to continue working on medication compliance and coping skills, attend outpatient treatment and verbalize feelings/concerns.

## 2023-09-20 NOTE — BH INPATIENT PSYCHIATRY PROGRESS NOTE - PRN MEDS
MEDICATIONS  (PRN):  acetaminophen     Tablet .. 650 milliGRAM(s) Oral every 6 hours PRN Temp greater or equal to 38C (100.4F), Mild Pain (1 - 3)  aluminum hydroxide/magnesium hydroxide/simethicone Suspension 30 milliLiter(s) Oral every 6 hours PRN Dyspepsia  diphenhydrAMINE 50 milliGRAM(s) Oral every 6 hours PRN Extrapyramidal prophylaxis or symptoms  diphenhydrAMINE Injectable 50 milliGRAM(s) IntraMuscular once PRN Extrapyramidal prophylaxis  haloperidol    Injectable 5 milliGRAM(s) IntraMuscular once PRN severe agitation  hemorrhoidal Ointment 1 Application(s) Rectal every 6 hours PRN rectal irritation  magnesium hydroxide Suspension 30 milliLiter(s) Oral daily PRN Constipation  nicotine  Polacrilex Gum 2 milliGRAM(s) Oral every 2 hours PRN nicotine dependence  OLANZapine 5 milliGRAM(s) Oral every 4 hours PRN agitation  polyethylene glycol 3350 17 Gram(s) Oral daily PRN constipation

## 2023-09-20 NOTE — BH INPATIENT PSYCHIATRY PROGRESS NOTE - NSTXPSYCHOGOALOTHER_PSY_ALL_CORE
Patient will be less symptomatic and stable for discharge with CGI improvement score of 2

## 2023-09-20 NOTE — BH DISCHARGE NOTE NURSING/SOCIAL WORK/PSYCH REHAB - DISCHARGE INSTRUCTIONS AFTERCARE APPOINTMENTS
In order to check the location, date, or time of your aftercare appointment, please refer to your Discharge Instructions Document given to you upon leaving the hospital.  If you have lost the instructions please call [phone]

## 2023-09-20 NOTE — BH INPATIENT PSYCHIATRY PROGRESS NOTE - NSBHASSESSSUMMFT_PSY_ALL_CORE
18 year old male domiciled with family, PPS of psychosis, hx of multiple admissions since 2022, hx of SA 3 years ago, cannabis use disorder, no hx of violence, no PMHx, presented with worsening cannabis induced-psychosis and paranoia since 08/30.       Updates 09/20  Patient cooperative on examination with some delusions about mother still present   Patient reports improved sleep  Mild drowsiness with switch to FGA Haldol, tolerating SERRATO well      PLAN  Patient requires acute inpatient care for treatment of psychosis  Patient admitted on a 913 voluntary status but submitted 3 day letter on 9/12  Patient too ill to be discharged to  a lower level of care at this time and will be retained on his 72 hour letter on 9/15  Patient does not require constant observation at this time and will follow with routine checks   Continue with Haloperidol 10 mg with Benztropine 1 mg BID  Goal of transition to FGA SERRATO  Continue trazodone 100 mg HS standing for insomnia   Treatment will include individual therapy/supportive therapy/rehab therapy/ psychopharmacological therapy and milieu therapy

## 2023-09-20 NOTE — BH INPATIENT PSYCHIATRY PROGRESS NOTE - MSE UNSTRUCTURED FT
On exam today the patient is cooperative with good eye contact   Speech is of normal rate and rhythm  Thought Process: Less tangential but still not fully linear  Thought content: patient with decreased delusions of family members being possessed by demons or imposters, still feels that mother is "not normal". Decreased delusions of thought broadcast/transmission  Perception: Denies active AH but previously heard voices that someone is "getting information" from him and that "bad things will happen"  Mood: Describes as "alright".  Affect: flat.    Patient denies active suicidal ideation, intention and plan.    Patient denies active aggressive/homicidal ideation, intent or plan.   Patient is Alert and oriented .   Patient is cognitively grossly intact.   Fund of knowledge is fair. Memory is intact  Insight and judgment are limited. Impulse control is intact at this time.    Vital signs are stable.

## 2023-09-20 NOTE — BH DISCHARGE NOTE NURSING/SOCIAL WORK/PSYCH REHAB - PATIENT PORTAL LINK FT
You can access the FollowMyHealth Patient Portal offered by [facility] by registering at the following website: [email or web address]. By joining [employer]’s FollowMyHealth portal, you will also be able to view your health information using other applications (apps) compatible with our system.

## 2023-09-20 NOTE — BH DISCHARGE NOTE NURSING/SOCIAL WORK/PSYCH REHAB - NSBHDCADDR1FT_A_CORE
[facility]  [address] , [facility]

## 2023-09-20 NOTE — BH DISCHARGE NOTE NURSING/SOCIAL WORK/PSYCH REHAB - NSCDUDCCRISIS_PSY_A_CORE
[facility]  1 [phone]) [facility] ([phone])  Text "WELL" to 12027  Website: www.[employer]/.[facility] 1 (509) 621-HOPE (4410) Website: www.safehorizon.org/.National Suicide Prevention Lifeline 8 (243) 202-7601/.  [facility]  1 (048) LIFENET (215-9414)/.  [facility]  (619) 182-2003/.  [facility]  [address] [place], [place] [address] [phone]   Hours:  Monday through Friday from 9 AM to 3 PM/988 Suicide and Crisis Lifeline

## 2023-09-21 VITALS — TEMPERATURE: 97 F

## 2023-09-21 PROCEDURE — 99239 HOSP IP/OBS DSCHRG MGMT >30: CPT

## 2023-09-21 RX ORDER — BENZTROPINE MESYLATE 1 MG
1 TABLET ORAL
Qty: 28 | Refills: 0
Start: 2023-09-21 | End: 2023-10-04

## 2023-09-21 RX ORDER — NICOTINE POLACRILEX 2 MG
1 GUM BUCCAL
Qty: 0 | Refills: 0 | DISCHARGE
Start: 2023-09-21

## 2023-09-21 RX ORDER — HALOPERIDOL DECANOATE 100 MG/ML
1 INJECTION INTRAMUSCULAR
Qty: 14 | Refills: 0
Start: 2023-09-21 | End: 2023-10-04

## 2023-09-21 RX ORDER — TRAZODONE HCL 50 MG
1 TABLET ORAL
Qty: 14 | Refills: 0
Start: 2023-09-21 | End: 2023-10-04

## 2023-09-21 RX ADMIN — Medication 650 MILLIGRAM(S): at 23:30

## 2023-09-21 RX ADMIN — Medication 50 MILLIGRAM(S): at 23:31

## 2023-09-21 RX ADMIN — Medication 2 MILLIGRAM(S): at 11:03

## 2023-09-21 RX ADMIN — Medication 1 MILLIGRAM(S): at 20:08

## 2023-09-21 RX ADMIN — Medication 1 PATCH: at 08:29

## 2023-09-21 RX ADMIN — HALOPERIDOL DECANOATE 10 MILLIGRAM(S): 100 INJECTION INTRAMUSCULAR at 20:08

## 2023-09-21 RX ADMIN — Medication 100 MILLIGRAM(S): at 20:08

## 2023-09-21 RX ADMIN — Medication 1 MILLIGRAM(S): at 08:29

## 2023-09-21 RX ADMIN — Medication 650 MILLIGRAM(S): at 10:50

## 2023-09-21 NOTE — BH INPATIENT PSYCHIATRY DISCHARGE NOTE - NSDCCPCAREPLAN_GEN_ALL_CORE_FT
PRINCIPAL DISCHARGE DIAGNOSIS  Diagnosis: Schizophrenia  Assessment and Plan of Treatment:       SECONDARY DISCHARGE DIAGNOSES  Diagnosis: Cannabis abuse, daily use  Assessment and Plan of Treatment:     Diagnosis: Substance abuse  Assessment and Plan of Treatment:

## 2023-09-21 NOTE — BH INPATIENT PSYCHIATRY PROGRESS NOTE - NSTXPROBSLPPAT_PSY_ALL_CORE
SLEEP PATTERN, DISTURBED

## 2023-09-21 NOTE — BH INPATIENT PSYCHIATRY PROGRESS NOTE - NSBHASSESSSUMMFT_PSY_ALL_CORE
18 year old male domiciled with family, PPS of psychosis, hx of multiple admissions since 2022, hx of SA 3 years ago, cannabis use disorder, no hx of violence, no PMHx, presented with worsening cannabis induced-psychosis and paranoia since 08/30.       Updates 09/21  Patient cooperative on examination with some delusions about mother still present   Patient reports improved sleep with improved behavioral control   Tolerating SERRATO well, denies side effects       PLAN  Patient requires acute inpatient care for treatment of psychosis  Patient admitted on a 913 voluntary status but submitted 3 day letter on 9/12  Patient does not require constant observation at this time and will follow with routine checks   Continue with Haloperidol 10 mg with Benztropine 1 mg BID  Goal of transition to FGA SERRATO  Continue trazodone 100 mg HS standing for insomnia   Treatment will include individual therapy/supportive therapy/rehab therapy/ psychopharmacological therapy and milieu therapy

## 2023-09-21 NOTE — BH INPATIENT PSYCHIATRY PROGRESS NOTE - NSTXMEDICGOAL_PSY_ALL_CORE
Be able to describe the benefit of medication/treatment

## 2023-09-21 NOTE — BH INPATIENT PSYCHIATRY DISCHARGE NOTE - NSBHFUPINTERVALHXFT_PSY_A_CORE
Patient seen for discharge day management after admission for psychotic episode   Greater than 30 minutes spent with patient and charting as part of discharge day management   Chart reviewed and discussed with treatment team    Patient continues to improve with his beliefs about his parents decreased in intensity   Able to visit with family with out issues  Stable for discharge

## 2023-09-21 NOTE — BH INPATIENT PSYCHIATRY PROGRESS NOTE - NSBHATTESTBILLING_PSY_A_CORE
Pt was experiencing 9/10 stabbing chest pain on the right side and SOB beginning at 2030 tonight.  Pt tried a lidocaine patch with no relief
72216-Ydkbejascd OBS or IP - high complexity OR 50-79 mins
87902-Gydjakdrsc OBS or IP - moderate complexity OR 35-49 mins
46877-Oyyadpckcs OBS or IP - high complexity OR 50-79 mins
56078-Arphpzvjse OBS or IP - moderate complexity OR 35-49 mins
93241-Amfveynlaj OBS or IP - high complexity OR 50-79 mins
96026-Wrrmqfqpdm OBS or IP - high complexity OR 50-79 mins
13269-Xytslbruya OBS or IP - moderate complexity OR 35-49 mins
46988-Pyhytyohya OBS or IP - high complexity OR 50-79 mins

## 2023-09-21 NOTE — BH INPATIENT PSYCHIATRY PROGRESS NOTE - NSTXPROBDISORG_PSY_ALL_CORE
DISORGANIZATION OF THOUGHT/BEHAVIOR
The patient's assessment was discussed face to face with Dr. Barraza and a collaborative plan of care was established.
DISORGANIZATION OF THOUGHT/BEHAVIOR

## 2023-09-21 NOTE — BH INPATIENT PSYCHIATRY PROGRESS NOTE - NSTXDISORGGOALOTHER_PSY_ALL_CORE
Patient will be less symptomatic and stable for discharge with CGI improvement score of 2

## 2023-09-21 NOTE — BH INPATIENT PSYCHIATRY PROGRESS NOTE - NSTXSUBMISGOAL_PSY_ALL_CORE
Be able to verbalize an understanding of the association between substance abuse and mental health

## 2023-09-21 NOTE — BH INPATIENT PSYCHIATRY PROGRESS NOTE - NSTXPROBSUBMIS_PSY_ALL_CORE
SUBSTANCE MISUSE
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
## 2023-09-21 NOTE — BH INPATIENT PSYCHIATRY PROGRESS NOTE - NSBHATTESTTYPEVISIT_PSY_A_CORE
Attending Only
Attending with Resident/Fellow/Student

## 2023-09-21 NOTE — BH INPATIENT PSYCHIATRY PROGRESS NOTE - NSBHATTESTCOMMENTATTENDFT_PSY_A_CORE
18 year old male domiciled with family, PPS of psychosis, hx of multiple admissions since 2022, hx of SA 3 years ago, cannabis use disorder, no hx of violence, no PMHx, presented with worsening cannabis induced-psychosis and paranoia since 08/30.    9/14  Patient remains delusional on exam with no insight into his symptoms and illness    PLAN  Patient admitted on a 913 voluntary status but submitted 3 day letter on 9/12  Patient is too ill to be discharged to any lower level of care at this time and will be retained 9/15 if he does not retract  Patient does not require constant observation at this time and will follow with routine checks   Aripiprazole trial  2 daily and 5 mg HS with goal of symptom stability and good tolerability with goal of SERRATO
18 year old male domiciled with family, PPS of psychosis, hx of multiple admissions since 2022, hx of SA 3 years ago, cannabis use disorder, no hx of violence, no PMHx, presented with worsening cannabis induced-psychosis and paranoia since 08/30.    9/18  Patient less disorganized   He remains delusional on exam but with less intensity to his delusions   Switch patient from Aripiprazole 2 AM and 5 mg HS to Haloperidol 10 mg with Benztropine 1 mg BID  Goal of transition to SERRATO    
18 year old male domiciled with family, PPS of psychosis, hx of multiple admissions since 2022, hx of SA 3 years ago, cannabis use disorder, no hx of violence, no PMHx, presented with worsening cannabis induced-psychosis and paranoia since 08/30.    9/20  Patient less disorganized and less pressured but remains delusional about parents  Sexually preoccupied with rehab therapist    Haloperidol 10 mg with Benztropine 1 mg BID  SERRATO haloperidol 50 mg today  
18 year old male domiciled with family, PPS of psychosis, hx of multiple admissions since 2022, hx of SA 3 years ago, cannabis use disorder, no hx of violence, no PMHx, presented with worsening cannabis induced-psychosis and paranoia since 08/30.    9/15  Patient remains delusional on exam with no insight into his symptoms and illness    Discussed plan to retain him on his 72 hour request  PLAN  Patient admitted on a 913 voluntary status but submitted 3 day letter on 9/12  Patient is too ill to be discharged to any lower level of care at this time and will be retained 9/15 today  Patient does not require constant observation at this time and will follow with routine checks   Aripiprazole trial  2 daily and 5 mg HS with goal of symptom stability and good tolerability with goal of SERRATO
18 year old male domiciled with family, PPS of psychosis, hx of multiple admissions since 2022, hx of SA 3 years ago, cannabis use disorder, no hx of violence, no PMHx, presented with worsening cannabis induced-psychosis and paranoia since 08/30.    9/21  Patient less disorganized and less pressured but remains delusional about parents  Haloperidol 10 mg with Benztropine 1 mg BID  SERRATO haloperidol 50 mg tolerated well  
18 year old male domiciled with family, PPS of psychosis, hx of multiple admissions since 2022, hx of SA 3 years ago, cannabis use disorder, no hx of violence, no PMHx, presented with worsening cannabis induced-psychosis and paranoia since 08/30.    9/19  Patient less disorganized and less pressured  He remains delusional on exam but with less intensity to his delusions   Haloperidol 10 mg with Benztropine 1 mg BID  Goal of transition to SERRATO 9/20  
18 year old male domiciled with family, PPS of psychosis, hx of multiple admissions since 2022, hx of SA 3 years ago, cannabis use disorder, no hx of violence, no PMHx, presented with worsening cannabis induced-psychosis and paranoia since 08/30.    9/13  Patient floridly psychotic on admission  With his 5 th inpatient admission  With bizarre delusions and AH and presentation consistent with schizophrenia  Presentation and course too severe for cannabis induced illness   PLAN  Patient admitted on a 913 voluntary status but submitted 3 day letter on 9/12  Patient does not require constant observation at this time and will follow with routine checks   will discontinue risperidone and start aripiprazole trial with goal of symptom stability and good tolerability with goal of SERRATO

## 2023-09-21 NOTE — BH INPATIENT PSYCHIATRY PROGRESS NOTE - NSTXSUBMISDATETRGT_PSY_ALL_CORE
[facility]  [address]  [place] [address]               Adam Willis   2017 11:11 AM   ED    Description:  Male : 1984   Department:  [facility]           Your Care was Coordinated By:     Provider Role From To    Cosme Bray MD Attending Provider 17 1118 --      Reason for Visit     Sinusitis           Diagnoses this Visit        Comments    Viral syndrome    -  Primary       ED Disposition     None           To Do List           Follow-up Information     Follow up with Primary Doctor No In 1 week.       These Medications        Disp Refills Start End    dextromethorphan-guaifenesin (MUCINEX DM) 60-1,200 mg TM12 20 tablet 0 2017    Take 1 tablet by mouth 2 (two) times daily as needed (cough and congestion). - Oral    loratadine-pseudoephedrine  mg (CLARITIN-D 24-HOUR)  mg per 24 hr tablet 10 tablet 0 2017    Take 1 tablet by mouth once daily. - Oral      [facility]     [facility] -  Assistance  Registered nurses in the [facility] provide clinical advisement, health education, appointment booking, and other advisory services.  Call for this free service at [phone].             Medications           Message regarding Medications     Verify the changes and/or additions to your medication regime listed below are the same as discussed with your clinician today.  If any of these changes or additions are incorrect, please notify your healthcare provider.        START taking these NEW medications        Refills    dextromethorphan-guaifenesin (MUCINEX DM) 60-1,200 mg TM12 0    Sig: Take 1 tablet by mouth 2 (two) times daily as needed (cough and congestion).    Class: Print    Route: Oral    loratadine-pseudoephedrine  mg (CLARITIN-D 24-HOUR)  mg per 24 hr tablet 0    Sig: Take 1 tablet by mouth once daily.    Class: Print    
"Route: Oral      These medications were administered today        Dose Freq    acetaminophen tablet 1,000 mg 1,000 mg ED 1 Time    Sig: Take 2 tablets (1,000 mg total) by mouth ED 1 Time.    Class: Normal    Route: Oral    ibuprofen tablet 800 mg 800 mg ED 1 Time    Sig: Take 2 tablets (800 mg total) by mouth ED 1 Time.    Class: Normal    Route: Oral      STOP taking these medications     ondansetron (ZOFRAN) 4 MG tablet Take 1 tablet (4 mg total) by mouth every 6 (six) hours.    ondansetron (ZOFRAN) 4 MG tablet Take 1 tablet (4 mg total) by mouth every 6 (six) hours as needed for Nausea.    omeprazole (PRILOSEC) 20 MG capsule Take 1 capsule (20 mg total) by mouth once daily.           Verify that the below list of medications is an accurate representation of the medications you are currently taking.  If none reported, the list may be blank. If incorrect, please contact your healthcare provider. Carry this list with you in case of emergency.           Current Medications     dextromethorphan-guaifenesin (MUCINEX DM) 60-1,200 mg TM12 Take 1 tablet by mouth 2 (two) times daily as needed (cough and congestion).    loratadine-pseudoephedrine  mg (CLARITIN-D 24-HOUR)  mg per 24 hr tablet Take 1 tablet by mouth once daily.           Clinical Reference Information           Your Vitals Were     BP Pulse Temp Resp Height Weight    129/76 81 98.3 °F (36.8 °C) (Oral) 20 5' 6" (1.676 m) 63.5 kg (140 lb)    SpO2 BMI             99% 22.6 kg/m2         Allergies as of 2/12/2017     No Known Allergies      Immunizations Administered on Date of Encounter - 2/12/2017     None      ED Micro, Lab, POCT     Start Ordered       Status Ordering Provider    02/12/17 1142 02/12/17 1141  Influenza antigen Nasopharyngeal Swab  STAT      Final result       ED Imaging Orders     None        Discharge Instructions         Viral Syndrome (Adult)  A viral illness may cause a number of symptoms. The symptoms depend on the part of the "
"body that the virus affects. If it settles in your nose, throat, and lungs, it may cause cough, sore throat, congestion, and sometimes headache. If it settles in your stomach and intestinal tract, it may cause vomiting and diarrhea. Sometimes it causes vague symptoms like "aching all over," feeling tired, loss of appetite, or fever.  A viral illness usually lasts 1 to 2 weeks, but sometimes it lasts longer. In some cases, a more serious infection can look like a viral syndrome in the first few days of the illness. You may need another exam and additional tests to know the difference. Watch for the warning signs listed below.  Home care  Follow these guidelines for taking care of yourself at home:  · If symptoms are severe, rest at home for the first 2 to 3 days.  · Stay away from cigarette smoke - both your smoke and the smoke from others.  · You may use over-the-counter acetaminophen or ibuprofen for fever, muscle aching, and headache, unless another medicine was prescribed for this. If you have chronic liver or kidney disease or ever had a stomach ulcer or GI bleeding, talk with your doctor before using these medicines. No one who is younger than 18 and ill with a fever should take aspirin. It may cause severe disease or death.  · Your appetite may be poor, so a light diet is fine. Avoid dehydration by drinking 8 to 12 8-ounce glasses of fluids each day. This may include water; orange juice; lemonade; apple, grape, and cranberry juice; clear fruit drinks; electrolyte replacement and sports drinks; and decaffeinated teas and coffee. If you have been diagnosed with a kidney disease, ask your doctor how much and what types of fluids you should drink to prevent dehydration. If you have kidney disease, drinking too much fluid can cause it build up in the your body and be dangerous to your health.  · Over-the-counter remedies won't shorten the length of the illness but may be helpful for cough, sore throat; and nasal "
19-Sep-2023
and sinus congestion. Don't use decongestants if you have high blood pressure.  Follow-up care  Follow up with your healthcare provider if you do not improve over the next week.  Call 911  Get emergency medical care if any of the following occur:  · Convulsion  · Feeling weak, dizzy, or like you are going to faint  · Chest pain, shortness of breath, wheezing, or difficulty breathing  When to seek medical advice  Call your healthcare provider right away if any of these occur:  · Cough with lots of colored sputum (mucus) or blood in your sputum  · Chest pain, shortness of breath, wheezing, or difficulty breathing  · Severe headache; face, neck, or ear pain  · Severe, constant pain in the lower right side of your belly (abdominal)  · Continued vomiting (cant keep liquids down)  · Frequent diarrhea (more than 5 times a day); blood (red or black color) or mucus in diarrhea  · Feeling weak, dizzy, or like you are going to faint  · Extreme thirst  · Fever of 100.4°F (38°C) or higher, or as directed by your healthcare provider  Date Last Reviewed: 9/25/2015  © 9688-7463 [employer]. [address] [place] [address]. All rights reserved. This information is not intended as a substitute for professional medical care. Always follow your healthcare professional's instructions.          [facility] Sign-Up     Activating your [facility] account is as easy as 1-2-3!     1) Visit [email or web address], select Sign Up Now, enter this activation code and your date of birth, then select Next.  Activation code not generated  Current Patient Portal Status: Account disabled      2) Create a username and password to use when you visit [facility] in the future and select a security question in case you lose your password and select Next.    3) Enter your e-mail address and click Sign Up!    Additional Information  If you have questions, please e-mail [email or web address] or call [phone] to talk to our [facility] staff. 
Remember, [facility] is NOT to be used for urgent needs. For medical emergencies, dial 911.         Smoking Cessation     If you would like to quit smoking:   You may be eligible for free services if you are a [place] resident and started smoking cigarettes before September 1, 1988.  Call the Smoking Cessation Trust (SCT) toll free at (220) 237-7633 or (850) 908-1445.   Call 1-800-QUIT-NOW if you do not meet the above criteria.             [facility] complies with applicable Federal civil rights laws and does not discriminate on the basis of race, color, national origin, age, disability, or sex.        Language Assistance Services     ATTENTION: Language assistance services are available, free of charge. Please call [phone].      ATENCIÓN: Si habla cyndy, tiene a prado disposición servicios gratuitos de asistencia lingüística. Llame al [phone].     REECE Ý: N?u b?n nói Ti?ng Vi?t, có các d?ch v? h? tr? ngôn ng? mi?n phí dành cho b?n. G?i s? [phone].        
27-Sep-2023
19-Sep-2023

## 2023-09-21 NOTE — BH INPATIENT PSYCHIATRY PROGRESS NOTE - NSTXSLPPATGOAL_PSY_ALL_CORE
Be able to sleep for a minimum of six hours daily

## 2023-09-21 NOTE — BH INPATIENT PSYCHIATRY DISCHARGE NOTE - HOSPITAL COURSE
Patient was admitted to [address] [place] [address] from     The patient was admitted with multiple delusions about his parents in the context of nonadherence. The patient did not like being on risperidone, because of weight gain and stiffness and being "medicated", and had discontinued it without telling his parents or treatment team.  He had use a lot of cannabis before admission and on admission was delusional that his parents have been replaced either by imposters or that they had been possessed by demons. He felt they weren’t who they appear to be and he would argue with them about this.   This delusion persisted on the unit when the patient felt that the MD providers, as well as the nurses were also either actors or possessed he even wondered whether he himself was possessed     Immediately after entering the unit, the patient submitted a three day letter to leave, but he was found to be to psychotic and ill to be discharged to any lower level of care, and he was retained on his 72 hour notice.    Initially his risperidone was changed to aripiprazole  With the hope of conversion to an SERRATO however, all the second generation SERRATO’s turned out to be cost prohibitive with co-pays of over $1200 per month and as a result after discussion with the patient and his family it was decided that a trial of haloperidol was indicated with transition to haloperidol Decanoate (SERRATO)  which had no co-pay for the family  Haloperidol was started at 10 mg HS and first dose of Haloperidol Decanoate 50 mg IM Monthly was given on 9/19/2023  NEXT DOSE OF HALOPERIDOL DECANOATE IS DUE ON 10/17/2023    On the regimen of the haloperidol patient improved to the point where those still having some of his fixed beliefs was markedly, improved with his visits with his father and mother and was able to be safely discharged home with outpatient follow up at [facility]        On exam today the patient is generally cooperative and makes fair eye contact.   Speech is clear and of normal rate.  Thought process: with no disorder of thought process.   Thought content: with no evidence of delusional beliefs.   Perception: Denies hallucinations.  Mood: Describes as "improved"   Affect: flat.  Patient denies suicidal and aggressive ideation, intent and plan.   AAO X3. Cognitively grossly intact.   Insight and judgment are improved.  Impulse control is intact at this time    Suicide and risk assessment performed prior to discharge. The patient has a low acute risk and low chronic risk of self-harm and aggression towards others. Protective factors include denying SI, no SIB, denying HI, good social supports in their family, no substance abuse, no current mood symptoms, no hopelessness, future-oriented in returning to home, no access to firearms.  Risk factors include presenting illness. Immediate risk was minimized by inpatient admission to a safe environment with appropriate supervision and limited access to lethal means. Future risk was minimized before discharge by treatment of acute episode, maximizing outpatient support, providing relevant patient education, discussing emergency procedures, and ensuring close follow-up. The patient remains at a low risk of self-harm, and such risk cannot be further ameliorated by continued inpatient treatment and the patient is therefore appropriate for discharge.       There were no behavioral problems on the unit.  Patient did not become agitated and did not require emergent intramuscular medications or seclusion / restraints.  Patient did not self-harm on the unit.  Patient remained actively engaged in treatment.  Patient participated in individual, group, and milieu therapy.  Patient got along appropriately with staff and peers.   Patient did not have any medical problems during this hospitalization.  There were no medical consultations.    A full discussion of the factors that predict treatment success and relapse was held including safety planning.  A discussion of the risks and benefits of patient’s medication was held including a discussion of the metabolic risks and risk of EPS and TD was done and AIMS Scale was performed and Score =0    On day of discharge, the patient no longer requires inpatient treatment and care. Patient denies all suicidal and aggressive ideation, intent and plan. Patient denies anxiety symptoms and panic attacks. Patient is not judged to be an acute danger to self or others at this time. Patient will be discharged to home and outpatient follow up at [facility]    Patient was admitted to [address] [place] [address] from 9/12/23-9/22/2023    The patient was admitted with multiple delusions about his parents in the context of nonadherence and cannabis abuse. The patient did not like being on risperidone, because of weight gain and stiffness and being "medicated", and had discontinued it without telling his parents or treatment team.  He had use a lot of cannabis before admission and on admission was delusional that his parents have been replaced either by imposters or that they had been possessed by demons. He felt they weren’t who they appear to be and he would argue with them about this.   This delusion persisted on the unit when the patient felt that the MD providers, as well as the nurses were also either actors or possessed he even wondered whether he himself was possessed     Immediately after entering the unit, the patient submitted a three day letter to leave, but he was found to be to psychotic and ill to be discharged to any lower level of care, and he was retained on his 72 hour notice.    Initially his risperidone was changed to aripiprazole  With the hope of conversion to an SERRATO however, all the second generation SERRATO’s turned out to be cost prohibitive with co-pays of over $1200 per month and as a result after discussion with the patient and his family it was decided that a trial of haloperidol was indicated with transition to haloperidol Decanoate (SERRATO)  which had no co-pay for the family  Haloperidol was started at 10 mg HS and first dose of Haloperidol Decanoate 50 mg IM Monthly was given on 9/19/2023  NEXT DOSE OF HALOPERIDOL DECANOATE IS DUE ON 10/17/2023    On the regimen of the haloperidol patient improved to the point where those still having some of his fixed beliefs was markedly, improved with his visits with his father and mother and was able to be safely discharged home with outpatient follow up at [facility]        On exam today the patient is generally cooperative and makes fair eye contact.   Speech is clear and of normal rate.  Thought process: with no disorder of thought process.   Thought content: with no evidence of delusional beliefs.   Perception: Denies hallucinations.  Mood: Describes as "improved"   Affect: flat.  Patient denies suicidal and aggressive ideation, intent and plan.   AAO X3. Cognitively grossly intact.   Insight and judgment are improved.  Impulse control is intact at this time    Suicide and risk assessment performed prior to discharge. The patient has a low acute risk and low chronic risk of self-harm and aggression towards others. Protective factors include denying SI, no SIB, denying HI, good social supports in their family, no substance abuse, no current mood symptoms, no hopelessness, future-oriented in returning to home, no access to firearms.  Risk factors include presenting illness. Immediate risk was minimized by inpatient admission to a safe environment with appropriate supervision and limited access to lethal means. Future risk was minimized before discharge by treatment of acute episode, maximizing outpatient support, providing relevant patient education, discussing emergency procedures, and ensuring close follow-up. The patient remains at a low risk of self-harm, and such risk cannot be further ameliorated by continued inpatient treatment and the patient is therefore appropriate for discharge.       There were no behavioral problems on the unit.  Patient did not become agitated and did not require emergent intramuscular medications or seclusion / restraints.  Patient did not self-harm on the unit.  Patient remained actively engaged in treatment.  Patient participated in individual, group, and milieu therapy.  Patient got along appropriately with staff and peers.   Patient did not have any medical problems during this hospitalization.  There were no medical consultations.    A full discussion of the factors that predict treatment success and relapse was held including safety planning.  A discussion of the risks and benefits of patient’s medication was held including a discussion of the metabolic risks and risk of EPS and TD was done and AIMS Scale was performed and Score =0    On day of discharge, the patient no longer requires inpatient treatment and care. Patient denies all suicidal and aggressive ideation, intent and plan. Patient denies anxiety symptoms and panic attacks. Patient is not judged to be an acute danger to self or others at this time. Patient will be discharged to home and outpatient follow up at [facility]

## 2023-09-21 NOTE — BH INPATIENT PSYCHIATRY DISCHARGE NOTE - MSE UNSTRUCTURED FT
On exam today the patient is cooperative with good eye contact   Speech is of normal rate and rhythm  Thought Process: Less tangential but still not fully linear  Thought content: patient with decreased intensity of delusions of family members being possessed  Perception: Denies active AH  Mood: Describes as "better".  Affect: flat.    Patient denies active suicidal ideation, intention and plan.    Patient denies active aggressive/homicidal ideation, intent or plan.   Patient is Alert and oriented .   Patient is cognitively grossly intact.   Fund of knowledge is fair. Memory is intact  Insight and judgment are limited. Impulse control is intact at this time.    Vital signs are stable.

## 2023-09-21 NOTE — BH INPATIENT PSYCHIATRY DISCHARGE NOTE - OTHER PAST PSYCHIATRIC HISTORY (INCLUDE DETAILS REGARDING ONSET, COURSE OF ILLNESS, INPATIENT/OUTPATIENT TREATMENT)
Pt is a 19 yo male with a history of hospitalizations, psychosis and paranoia. Pt was attending [employer], operated by [facility], lives at home with parents and brothers. Pt last took psychiatric medication one year ago, has not followed up with psychiatrist. Pt has little insight into his illness, reports he needs his eye tested due to seeing things. Pt reports A/V/H, came to the hospital to rest his mind.

## 2023-09-21 NOTE — BH INPATIENT PSYCHIATRY PROGRESS NOTE - NSBHCHARTREVIEWVS_PSY_A_CORE FT
Vital Signs Last 24 Hrs  T(C): --  T(F): --  HR: --  BP: --  BP(mean): --  RR: --  SpO2: --    Orthostatic VS  09-12-23 @ 16:35  Lying BP: --/-- HR: --  Sitting BP: 112/78 HR: 82  Standing BP: 110/70 HR: 80  Site: --  Mode: --  
Vital Signs Last 24 Hrs  T(C): 36.2 (09-21-23 @ 06:33), Max: 36.2 (09-21-23 @ 06:33)  T(F): 97.1 (09-21-23 @ 06:33), Max: 97.1 (09-21-23 @ 06:33)  HR: --  BP: --  BP(mean): --  RR: --  SpO2: --    Orthostatic VS  09-21-23 @ 06:33  Lying BP: --/-- HR: --  Sitting BP: 107/68 HR: 74  Standing BP: 115/60 HR: 88  Site: --  Mode: --  Orthostatic VS  09-20-23 @ 08:22  Lying BP: --/-- HR: --  Sitting BP: 113/89 HR: 88  Standing BP: 124/68 HR: 98  Site: --  Mode: --  
Vital Signs Last 24 Hrs  T(C): 36.4 (09-16-23 @ 06:22), Max: 37 (09-15-23 @ 18:51)  T(F): 97.5 (09-16-23 @ 06:22), Max: 98.6 (09-15-23 @ 18:51)  HR: --  BP: --  BP(mean): --  RR: --  SpO2: --    Orthostatic VS  09-16-23 @ 06:22  Lying BP: --/-- HR: --  Sitting BP: 134/66 HR: 81  Standing BP: --/-- HR: --  Site: --  Mode: --  Orthostatic VS  09-15-23 @ 18:51  Lying BP: --/-- HR: --  Sitting BP: 136/56 HR: 86  Standing BP: 137/84 HR: 107  Site: --  Mode: --  Orthostatic VS  09-15-23 @ 06:43  Lying BP: --/-- HR: --  Sitting BP: 127/67 HR: 82  Standing BP: 121/78 HR: 100  Site: --  Mode: --  
Vital Signs Last 24 Hrs  T(C): 36.2 (09-15-23 @ 06:43), Max: 36.2 (09-15-23 @ 06:43)  T(F): 97.1 (09-15-23 @ 06:43), Max: 97.1 (09-15-23 @ 06:43)  HR: --  BP: --  BP(mean): --  RR: --  SpO2: --    Orthostatic VS  09-15-23 @ 06:43  Lying BP: --/-- HR: --  Sitting BP: 127/67 HR: 82  Standing BP: 121/78 HR: 100  Site: --  Mode: --  
Vital Signs Last 24 Hrs  T(C): 36.7 (09-18-23 @ 07:41), Max: 36.7 (09-18-23 @ 07:41)  T(F): 98 (09-18-23 @ 07:41), Max: 98 (09-18-23 @ 07:41)  HR: --  BP: --  BP(mean): --  RR: --  SpO2: --    Orthostatic VS  09-18-23 @ 07:41  Lying BP: 120/98 HR: 78  Sitting BP: 134/73 HR: 93  Standing BP: --/-- HR: --  Site: --  Mode: --  Orthostatic VS  09-17-23 @ 06:18  Lying BP: --/-- HR: --  Sitting BP: 132/74 HR: 79  Standing BP: 128/80 HR: 90  Site: --  Mode: --  
Vital Signs Last 24 Hrs  T(C): 36.6 (09-12-23 @ 16:35), Max: 36.6 (09-12-23 @ 16:35)  T(F): 97.8 (09-12-23 @ 16:35), Max: 97.8 (09-12-23 @ 16:35)  HR: 104 (09-13-23 @ 06:35) (104 - 104)  BP: 126/79 (09-13-23 @ 06:35) (126/79 - 126/79)  BP(mean): --  RR: --  SpO2: --    Orthostatic VS  09-12-23 @ 16:35  Lying BP: --/-- HR: --  Sitting BP: 112/78 HR: 82  Standing BP: 110/70 HR: 80  Site: --  Mode: --  
Vital Signs Last 24 Hrs  T(C): 37 (09-20-23 @ 08:22), Max: 37 (09-20-23 @ 08:22)  T(F): 98.6 (09-20-23 @ 08:22), Max: 98.6 (09-20-23 @ 08:22)  HR: --  BP: --  BP(mean): --  RR: --  SpO2: --    Orthostatic VS  09-20-23 @ 08:22  Lying BP: --/-- HR: --  Sitting BP: 113/89 HR: 88  Standing BP: 124/68 HR: 98  Site: --  Mode: --  Orthostatic VS  09-19-23 @ 08:19  Lying BP: 151/71 HR: 92  Sitting BP: 138/68 HR: 100  Standing BP: --/-- HR: --  Site: --  Mode: --  
Vital Signs Last 24 Hrs  T(C): 36.6 (09-19-23 @ 08:19), Max: 36.6 (09-19-23 @ 08:19)  T(F): 97.9 (09-19-23 @ 08:19), Max: 97.9 (09-19-23 @ 08:19)  HR: --  BP: --  BP(mean): --  RR: --  SpO2: --    Orthostatic VS  09-19-23 @ 08:19  Lying BP: 151/71 HR: 92  Sitting BP: 138/68 HR: 100  Standing BP: --/-- HR: --  Site: --  Mode: --  Orthostatic VS  09-18-23 @ 07:41  Lying BP: 120/98 HR: 78  Sitting BP: 134/73 HR: 93  Standing BP: --/-- HR: --  Site: --  Mode: --
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
## 2023-09-22 RX ADMIN — Medication 650 MILLIGRAM(S): at 00:39

## 2023-09-22 RX ADMIN — Medication 1 PATCH: at 08:25

## 2023-09-22 RX ADMIN — Medication 1 MILLIGRAM(S): at 08:25

## 2024-05-01 NOTE — BH INPATIENT PSYCHIATRY PROGRESS NOTE - NSBHMSETHTCONTENT_PSY_A_CORE
You can access the FollowMyHealth Patient Portal offered by [facility] by registering at the following website: [email or web address]. By joining [facility]’s FollowMyHealth portal, you will also be able to view your health information using other applications (apps) compatible with our system. [employer]

## 2024-11-22 ENCOUNTER — OFFICE (OUTPATIENT)
Dept: URBAN - METROPOLITAN AREA CLINIC 6 | Facility: CLINIC | Age: 19
Setting detail: OPHTHALMOLOGY
End: 2024-11-22
Payer: COMMERCIAL

## 2024-11-22 DIAGNOSIS — H16.223: ICD-10-CM

## 2024-11-22 DIAGNOSIS — H52.13: ICD-10-CM

## 2024-11-22 PROCEDURE — 92015 DETERMINE REFRACTIVE STATE: CPT | Performed by: OPHTHALMOLOGY

## 2024-11-22 PROCEDURE — 92002 INTRM OPH EXAM NEW PATIENT: CPT | Performed by: OPHTHALMOLOGY

## 2024-11-22 ASSESSMENT — REFRACTION_AUTOREFRACTION
OD_SPHERE: -1.00
OS_SPHERE: -0.50
OD_SPHERE: -1.00
OD_AXIS: 10
OD_CYLINDER: -2.25
OD_CYLINDER: -2.00
OS_CYLINDER: -3.75
OS_CYLINDER: -2.75
OD_AXIS: 007
OS_AXIS: 161
OS_AXIS: 160
OS_SPHERE: -0.50

## 2024-11-22 ASSESSMENT — KERATOMETRY
OS_K1POWER_DIOPTERS: 41.25
OS_K2POWER_DIOPTERS: 45.00
OD_K2POWER_DIOPTERS: 44.25
OD_K1POWER_DIOPTERS: 41.75
OS_AXISANGLE_DEGREES: 071
OD_AXISANGLE_DEGREES: 101

## 2024-11-22 ASSESSMENT — REFRACTION_MANIFEST
OS_CYLINDER: -3.00
OS_AXIS: 160
OD_CYLINDER: -1.75
OD_AXIS: 005
OD_SPHERE: -1.00
OS_SPHERE: -0.50
OD_SPHERE: -1.00
OD_CYLINDER: -1.75
OS_VA1: 20/20
OD_VA1: 20/20
OD_AXIS: 005
OS_CYLINDER: -2.75
OD_VA1: 20/20
OS_SPHERE: -0.50
OS_AXIS: 160
OS_VA1: 20/20

## 2024-11-22 ASSESSMENT — REFRACTION_CURRENTRX
OS_VPRISM_DIRECTION: SV
OD_VPRISM_DIRECTION: SV
OD_AXIS: 001
OS_AXIS: 163
OS_SPHERE: -0.50
OS_CYLINDER: -2.25
OS_OVR_VA: 20/
OD_SPHERE: -1.25
OD_OVR_VA: 20/
OD_CYLINDER: -1.50

## 2024-11-22 ASSESSMENT — TONOMETRY
OS_IOP_MMHG: 15
OD_IOP_MMHG: 15

## 2024-11-22 ASSESSMENT — VISUAL ACUITY
OS_BCVA: 20/20-1
OD_BCVA: 20/25

## 2024-11-22 ASSESSMENT — SUPERFICIAL PUNCTATE KERATITIS (SPK)
OD_SPK: 1+
OS_SPK: T